# Patient Record
Sex: MALE | URBAN - METROPOLITAN AREA
[De-identification: names, ages, dates, MRNs, and addresses within clinical notes are randomized per-mention and may not be internally consistent; named-entity substitution may affect disease eponyms.]

---

## 2017-10-17 ENCOUNTER — APPOINTMENT (RX ONLY)
Dept: URBAN - METROPOLITAN AREA CLINIC 124 | Facility: CLINIC | Age: 72
Setting detail: DERMATOLOGY
End: 2017-10-17

## 2017-10-17 DIAGNOSIS — L82.1 OTHER SEBORRHEIC KERATOSIS: ICD-10-CM

## 2017-10-17 DIAGNOSIS — Z71.89 OTHER SPECIFIED COUNSELING: ICD-10-CM

## 2017-10-17 DIAGNOSIS — L81.4 OTHER MELANIN HYPERPIGMENTATION: ICD-10-CM

## 2017-10-17 DIAGNOSIS — D18.0 HEMANGIOMA: ICD-10-CM

## 2017-10-17 DIAGNOSIS — L82.0 INFLAMED SEBORRHEIC KERATOSIS: ICD-10-CM

## 2017-10-17 PROBLEM — H91.90 UNSPECIFIED HEARING LOSS, UNSPECIFIED EAR: Status: ACTIVE | Noted: 2017-10-17

## 2017-10-17 PROBLEM — E78.5 HYPERLIPIDEMIA, UNSPECIFIED: Status: ACTIVE | Noted: 2017-10-17

## 2017-10-17 PROBLEM — K21.9 GASTRO-ESOPHAGEAL REFLUX DISEASE WITHOUT ESOPHAGITIS: Status: ACTIVE | Noted: 2017-10-17

## 2017-10-17 PROBLEM — D18.01 HEMANGIOMA OF SKIN AND SUBCUTANEOUS TISSUE: Status: ACTIVE | Noted: 2017-10-17

## 2017-10-17 PROCEDURE — 99203 OFFICE O/P NEW LOW 30 MIN: CPT | Mod: 25

## 2017-10-17 PROCEDURE — 17110 DESTRUCTION B9 LES UP TO 14: CPT

## 2017-10-17 PROCEDURE — ? PRESCRIPTION

## 2017-10-17 PROCEDURE — ? LIQUID NITROGEN

## 2017-10-17 PROCEDURE — ? COUNSELING

## 2017-10-17 RX ORDER — PHARMACY COMPOUNDING ACCESSORY
EACH MISCELLANEOUS
Qty: 1 | Refills: 3 | COMMUNITY
Start: 2017-10-17

## 2017-10-17 RX ADMIN — Medication: at 19:20

## 2017-10-17 ASSESSMENT — LOCATION SIMPLE DESCRIPTION DERM
LOCATION SIMPLE: LEFT HAND
LOCATION SIMPLE: ABDOMEN
LOCATION SIMPLE: UPPER BACK

## 2017-10-17 ASSESSMENT — LOCATION DETAILED DESCRIPTION DERM
LOCATION DETAILED: PERIUMBILICAL SKIN
LOCATION DETAILED: INFERIOR THORACIC SPINE
LOCATION DETAILED: LEFT RADIAL DORSAL HAND
LOCATION DETAILED: EPIGASTRIC SKIN

## 2017-10-17 ASSESSMENT — LOCATION ZONE DERM
LOCATION ZONE: TRUNK
LOCATION ZONE: HAND

## 2017-10-17 NOTE — PROCEDURE: MIPS QUALITY
Quality 402: Tobacco Use And Help With Quitting Among Adolescents: Patient screened for tobacco and never smoked
Quality 110: Preventive Care And Screening: Influenza Immunization: Influenza Immunization Administered during Influenza season
Detail Level: Detailed
Quality 111:Pneumonia Vaccination Status For Older Adults: Pneumococcal Vaccination Previously Received

## 2017-10-17 NOTE — PROCEDURE: LIQUID NITROGEN
Detail Level: Detailed
Consent: The patient's consent was obtained including but not limited to risks of crusting, scabbing, blistering, scarring, darker or lighter pigmentary change, recurrence, incomplete removal and infection.
Render Post-Care Instructions In Note?: no
Number Of Freeze-Thaw Cycles: 2 freeze-thaw cycles
Medical Necessity Clause: This procedure was medically necessary because the lesions that were treated were:
Post-Care Instructions: I reviewed with the patient in detail post-care instructions. Patient is to wear sunprotection, and avoid picking at any of the treated lesions. Pt may apply Vaseline to crusted or scabbing areas.
Medical Necessity Information: It is in your best interest to select a reason for this procedure from the list below. All of these items fulfill various CMS LCD requirements except the new and changing color options.

## 2022-06-04 ENCOUNTER — TELEPHONE ENCOUNTER (OUTPATIENT)
Dept: URBAN - METROPOLITAN AREA CLINIC 68 | Facility: CLINIC | Age: 77
End: 2022-06-04

## 2022-06-05 ENCOUNTER — TELEPHONE ENCOUNTER (OUTPATIENT)
Dept: URBAN - METROPOLITAN AREA CLINIC 68 | Facility: CLINIC | Age: 77
End: 2022-06-05

## 2022-06-05 RX ORDER — OMEGA-3S/DHA/EPA/FISH OIL/D3 300MG-1000
FISH OIL BURP-LESS( 1200MG ORAL  DAILY ) ACTIVE -HX ENTRY CAPSULE ORAL DAILY
Status: ACTIVE | COMMUNITY
Start: 2012-09-19

## 2022-06-05 RX ORDER — ZOLPIDEM TARTRATE 10 MG/1
AMBIEN( 10MG ORAL  DAILY ) ACTIVE -HX ENTRY TABLET, FILM COATED ORAL DAILY
Status: ACTIVE | COMMUNITY
Start: 2012-09-19

## 2022-06-05 RX ORDER — FINASTERIDE 5 MG/1
FINASTERIDE( 5MG ORAL  DAILY ) ACTIVE -HX ENTRY TABLET, FILM COATED ORAL DAILY
Status: ACTIVE | COMMUNITY
Start: 2012-09-19

## 2022-06-05 RX ORDER — SIMVASTATIN 10 MG/1
ZOCOR( 10MG ORAL  DAILY ) ACTIVE -HX ENTRY TABLET, FILM COATED ORAL DAILY
Status: ACTIVE | COMMUNITY
Start: 2012-09-19

## 2022-06-05 RX ORDER — LISINOPRIL 20 MG/1
LISINOPRIL( 20MG ORAL  DAILY ) ACTIVE -HX ENTRY TABLET ORAL DAILY
Status: ACTIVE | COMMUNITY
Start: 2012-09-19

## 2022-06-25 ENCOUNTER — TELEPHONE ENCOUNTER (OUTPATIENT)
Age: 77
End: 2022-06-25

## 2022-06-26 ENCOUNTER — TELEPHONE ENCOUNTER (OUTPATIENT)
Age: 77
End: 2022-06-26

## 2022-06-26 RX ORDER — FINASTERIDE 5 MG/1
FINASTERIDE( 5MG ORAL  DAILY ) ACTIVE -HX ENTRY TABLET, FILM COATED ORAL DAILY
Status: ACTIVE | COMMUNITY
Start: 2012-09-19

## 2022-06-26 RX ORDER — ASPIRIN 81 MG/1
ASPIRIN( 81MG ORAL  DAILY ) ACTIVE -HX ENTRY TABLET, DELAYED RELEASE ORAL DAILY
Status: ACTIVE | COMMUNITY
Start: 2012-09-19

## 2022-06-26 RX ORDER — SIMVASTATIN 10 MG/1
ZOCOR( 10MG ORAL  DAILY ) ACTIVE -HX ENTRY TABLET, FILM COATED ORAL DAILY
Status: ACTIVE | COMMUNITY
Start: 2012-09-19

## 2022-06-26 RX ORDER — ZOLPIDEM TARTRATE 10 MG
AMBIEN( 10MG ORAL  DAILY ) ACTIVE -HX ENTRY TABLET ORAL DAILY
Status: ACTIVE | COMMUNITY
Start: 2012-09-19

## 2022-06-26 RX ORDER — LISINOPRIL 20 MG/1
LISINOPRIL( 20MG ORAL  DAILY ) ACTIVE -HX ENTRY TABLET ORAL DAILY
Status: ACTIVE | COMMUNITY
Start: 2012-09-19

## 2022-06-26 RX ORDER — OMEGA-3S/DHA/EPA/FISH OIL/D3 300MG-1000
FISH OIL BURP-LESS( 1200MG ORAL  DAILY ) ACTIVE -HX ENTRY CAPSULE ORAL DAILY
Status: ACTIVE | COMMUNITY
Start: 2012-09-19

## 2023-04-27 NOTE — PROGRESS NOTES
"      Subjective     Luis Bain is a 77 y.o. male who presents for Follow-up (Follow up meds).      HPI  The patient is a 77 year-old male presenting to the clinic for follow up on medications.  Discussed Lumbar Radiculopathy.  Referral placed with Neurosurgery 5/1/2023.  Discussed Prostate nodules.  Referral placed with Urology 5/1/2023.  Complete blood work after fasting for 10 hours.  Follow up in office.     Follow-up.  Had a colonoscopy done.  Goes to neurologist for memory problems.  Discussed L5-S1 spinal stenosis    Complaining feeling tired.  Advised on diet exercise.  Educated on dyslipidemia and diet exercise.  Discussed blood work for screening for condition.  Educated on BPH.  Educated hypertension low-salt and exercise.  Educated on acid reflux and heartburn prevention.  Denies nausea vomiting and diarrhea educated on vitamin D deficiency.  Discussed about prostate nodule.  Keep appointment with the urologist.  Discussed about lumbar radiculopathy.  Keep appointment with the neurosurgeon.  Discussed memory problem.  Keep appointment with neurologist          Review of Systems  Review of systems    General.  Denies fever.  Denies chills.    HEENT denies nasal congestion.  Denies sinus pressure.    Respiratory.  Denies cough.  Denies shortness of breath.    Cardiovascular.  Denies chest pain.  Denies heart palpitations.  Denies shortness of breath.    Gastrointestinal.  Denies nausea vomiting diarrhea.  Denies abdominal pain.    Genitourinary.  Dictated as above.      Neurology.  Dictated as above.  Musculoskeletal.  Dictated as above.      Endocrinology.  Denies cold intolerance.  Denies hot intolerance.    Psychiatric.  Denies depression.  Denies anxiety.  Denies suicidal.  Denies homicidal.      Objective   /76 (BP Location: Left arm, Patient Position: Sitting, BP Cuff Size: Adult)   Pulse 56   Ht 1.803 m (5' 11\")   Wt 66.1 kg (145 lb 12.8 oz)   SpO2 99%   BMI 20.33 kg/m²    "     Physical Exam  General.  Not in distress.  HEENT normocephalic anicteric sclerae.  Neck soft supple no thyromegaly.  No carotid bruit.  Lungs are clear.  Heart regular.  Abdomen soft nontender nondistended bowel sounds are positive.  Extremities no clubbing cyanosis or edema.  Psychiatric.  Has good eye contact.  No crying spells noted.  Speech was normal.  Denies depression.  Denies suicidal.  Denies homicidal.  Musculoskeletal.  Tenderness noted on lower lumbar paraspinal muscular area    Assessment/Plan     1.  Fatigue.  Denies restless legs but denies obstructive sleep apnea.  2.  Dyslipidemia.  Educated on diet and exercise.  We will continue monitor.    3.  Screening for condition.  Dictated as above.    4.  BPH.  Dictated as above.    5.  Hypertension.  Educated on low-salt and exercise.  We will continue monitor.    6.  Acid reflux.  Educated on acid reflux and heartburn and denies nausea vomiting and diarrhea    7.  Vitamin D deficiency.  Educated on vitamin D deficiency.  We will continue monitor.    8.  Prostate nodule.  Dictated as above.    9.  Lumbar radiculopathy.  Dictated as above.    10.  Memory problem.  Dictated as above                                  Problem List Items Addressed This Visit          Circulatory    Essential hypertension    Relevant Orders    Albumin , Urine Random       Digestive    GERD (gastroesophageal reflux disease)       Genitourinary    BPH (benign prostatic hyperplasia)    Relevant Orders    PSA, total and free       Other    Dyslipidemia    Relevant Orders    Lipid panel    Fatigue - Primary    Relevant Orders    CBC and Auto Differential    Comprehensive metabolic panel    Urinalysis with Reflex Microscopic    Tsh With Reflex To Free T4 If Abnormal    Vitamin B12    Folate     Other Visit Diagnoses       Screening for condition        Relevant Orders    Type and screen    Vitamin D deficiency        Relevant Orders    Vitamin D 25-Hydroxy,Total    Prostate nodule         Relevant Orders    Referral to Urology    Lumbar radiculopathy        Relevant Orders    Referral to Neurosurgery    Memory problem            Scribsanya Attestation  By signing my name below, I, Lien Boston   attest that this documentation has been prepared under the direction and in the presence of Oren Tucker MD.

## 2023-05-01 ENCOUNTER — OFFICE VISIT (OUTPATIENT)
Dept: PRIMARY CARE | Facility: CLINIC | Age: 78
End: 2023-05-01
Payer: MEDICARE

## 2023-05-01 VITALS
HEART RATE: 56 BPM | WEIGHT: 145.8 LBS | HEIGHT: 71 IN | BODY MASS INDEX: 20.41 KG/M2 | SYSTOLIC BLOOD PRESSURE: 114 MMHG | DIASTOLIC BLOOD PRESSURE: 76 MMHG | OXYGEN SATURATION: 99 %

## 2023-05-01 DIAGNOSIS — N40.1 BENIGN PROSTATIC HYPERPLASIA WITH NOCTURIA: ICD-10-CM

## 2023-05-01 DIAGNOSIS — R53.83 OTHER FATIGUE: Primary | ICD-10-CM

## 2023-05-01 DIAGNOSIS — M54.16 LUMBAR RADICULOPATHY: ICD-10-CM

## 2023-05-01 DIAGNOSIS — K21.9 GASTROESOPHAGEAL REFLUX DISEASE, UNSPECIFIED WHETHER ESOPHAGITIS PRESENT: ICD-10-CM

## 2023-05-01 DIAGNOSIS — Z13.9 SCREENING FOR CONDITION: ICD-10-CM

## 2023-05-01 DIAGNOSIS — R41.3 MEMORY PROBLEM: ICD-10-CM

## 2023-05-01 DIAGNOSIS — R35.1 BENIGN PROSTATIC HYPERPLASIA WITH NOCTURIA: ICD-10-CM

## 2023-05-01 DIAGNOSIS — E78.5 DYSLIPIDEMIA: ICD-10-CM

## 2023-05-01 DIAGNOSIS — N40.2 PROSTATE NODULE: ICD-10-CM

## 2023-05-01 DIAGNOSIS — I10 ESSENTIAL HYPERTENSION: ICD-10-CM

## 2023-05-01 DIAGNOSIS — E55.9 VITAMIN D DEFICIENCY: ICD-10-CM

## 2023-05-01 PROBLEM — H81.10 BPPV (BENIGN PAROXYSMAL POSITIONAL VERTIGO): Status: ACTIVE | Noted: 2023-05-01

## 2023-05-01 PROBLEM — R51.9 HEADACHE: Status: ACTIVE | Noted: 2023-05-01

## 2023-05-01 PROBLEM — R79.89 ABNORMAL CBC: Status: ACTIVE | Noted: 2023-05-01

## 2023-05-01 PROBLEM — N40.0 BPH (BENIGN PROSTATIC HYPERPLASIA): Status: ACTIVE | Noted: 2023-05-01

## 2023-05-01 PROBLEM — R93.89 ABNORMAL CT SCAN: Status: ACTIVE | Noted: 2023-05-01

## 2023-05-01 PROBLEM — H10.32 ACUTE BACTERIAL CONJUNCTIVITIS OF LEFT EYE: Status: ACTIVE | Noted: 2023-05-01

## 2023-05-01 PROCEDURE — 1159F MED LIST DOCD IN RCRD: CPT | Performed by: FAMILY MEDICINE

## 2023-05-01 PROCEDURE — 3074F SYST BP LT 130 MM HG: CPT | Performed by: FAMILY MEDICINE

## 2023-05-01 PROCEDURE — 3078F DIAST BP <80 MM HG: CPT | Performed by: FAMILY MEDICINE

## 2023-05-01 PROCEDURE — 99214 OFFICE O/P EST MOD 30 MIN: CPT | Performed by: FAMILY MEDICINE

## 2023-05-01 PROCEDURE — 1036F TOBACCO NON-USER: CPT | Performed by: FAMILY MEDICINE

## 2023-05-01 PROCEDURE — 1160F RVW MEDS BY RX/DR IN RCRD: CPT | Performed by: FAMILY MEDICINE

## 2023-05-01 RX ORDER — EPINEPHRINE 0.22MG
1 AEROSOL WITH ADAPTER (ML) INHALATION 2 TIMES DAILY
COMMUNITY
Start: 2020-08-12

## 2023-05-01 RX ORDER — VALSARTAN 160 MG/1
160 TABLET ORAL DAILY
COMMUNITY
End: 2024-04-22

## 2023-05-01 RX ORDER — TADALAFIL 5 MG/1
5 TABLET ORAL DAILY
COMMUNITY
Start: 2023-04-24 | End: 2023-10-09 | Stop reason: SDUPTHER

## 2023-05-01 RX ORDER — LOSARTAN POTASSIUM 100 MG/1
1 TABLET ORAL DAILY
COMMUNITY
Start: 2020-08-12 | End: 2023-05-25 | Stop reason: SDUPTHER

## 2023-05-01 RX ORDER — LANSOPRAZOLE 30 MG/1
30 CAPSULE, DELAYED RELEASE ORAL
COMMUNITY
Start: 2015-07-07

## 2023-05-01 RX ORDER — LOSARTAN POTASSIUM 100 MG/1
100 TABLET ORAL
COMMUNITY
End: 2023-06-12 | Stop reason: SDUPTHER

## 2023-05-01 RX ORDER — ONDANSETRON 4 MG/1
4 TABLET, ORALLY DISINTEGRATING ORAL EVERY 8 HOURS PRN
COMMUNITY
Start: 2022-07-12

## 2023-05-01 RX ORDER — BUTYROSPERMUM PARKII(SHEA BUTTER), SIMMONDSIA CHINENSIS (JOJOBA) SEED OIL, ALOE BARBADENSIS LEAF EXTRACT .01; 1; 3.5 G/100G; G/100G; G/100G
100 LIQUID TOPICAL DAILY
COMMUNITY

## 2023-05-01 RX ORDER — MECLIZINE HCL 12.5 MG 12.5 MG/1
1 TABLET ORAL 3 TIMES DAILY PRN
COMMUNITY
Start: 2022-08-02

## 2023-05-01 RX ORDER — SIMVASTATIN 10 MG/1
10 TABLET, FILM COATED ORAL NIGHTLY
COMMUNITY
Start: 2020-08-12 | End: 2023-05-25 | Stop reason: SDUPTHER

## 2023-05-01 RX ORDER — FINASTERIDE 5 MG/1
5 TABLET, FILM COATED ORAL DAILY
COMMUNITY
Start: 2020-08-12 | End: 2023-05-25 | Stop reason: SDUPTHER

## 2023-05-01 ASSESSMENT — ENCOUNTER SYMPTOMS
LOSS OF SENSATION IN FEET: 0
DEPRESSION: 0
OCCASIONAL FEELINGS OF UNSTEADINESS: 0

## 2023-05-01 ASSESSMENT — PAIN SCALES - GENERAL: PAINLEVEL: 0-NO PAIN

## 2023-05-05 ENCOUNTER — LAB (OUTPATIENT)
Dept: LAB | Facility: LAB | Age: 78
End: 2023-05-05
Payer: MEDICARE

## 2023-05-05 DIAGNOSIS — Z13.9 SCREENING FOR CONDITION: ICD-10-CM

## 2023-05-05 DIAGNOSIS — N40.1 BENIGN PROSTATIC HYPERPLASIA WITH NOCTURIA: ICD-10-CM

## 2023-05-05 DIAGNOSIS — R53.83 OTHER FATIGUE: ICD-10-CM

## 2023-05-05 DIAGNOSIS — E55.9 VITAMIN D DEFICIENCY: ICD-10-CM

## 2023-05-05 DIAGNOSIS — E78.5 DYSLIPIDEMIA: ICD-10-CM

## 2023-05-05 DIAGNOSIS — R35.1 BENIGN PROSTATIC HYPERPLASIA WITH NOCTURIA: ICD-10-CM

## 2023-05-05 LAB
ABO GROUP (TYPE) IN BLOOD: NORMAL
ALANINE AMINOTRANSFERASE (SGPT) (U/L) IN SER/PLAS: 10 U/L (ref 10–52)
ALBUMIN (G/DL) IN SER/PLAS: 4.4 G/DL (ref 3.4–5)
ALKALINE PHOSPHATASE (U/L) IN SER/PLAS: 44 U/L (ref 33–136)
ANION GAP IN SER/PLAS: 15 MMOL/L (ref 10–20)
ANTIBODY SCREEN: NORMAL
APPEARANCE, URINE: CLEAR
ASPARTATE AMINOTRANSFERASE (SGOT) (U/L) IN SER/PLAS: 18 U/L (ref 9–39)
BASOPHILS (10*3/UL) IN BLOOD BY AUTOMATED COUNT: 0.04 X10E9/L (ref 0–0.1)
BASOPHILS/100 LEUKOCYTES IN BLOOD BY AUTOMATED COUNT: 0.8 % (ref 0–2)
BILIRUBIN TOTAL (MG/DL) IN SER/PLAS: 0.9 MG/DL (ref 0–1.2)
BILIRUBIN, URINE: NEGATIVE
BLOOD, URINE: NEGATIVE
CALCIUM (MG/DL) IN SER/PLAS: 9.4 MG/DL (ref 8.6–10.3)
CARBON DIOXIDE, TOTAL (MMOL/L) IN SER/PLAS: 27 MMOL/L (ref 21–32)
CHLORIDE (MMOL/L) IN SER/PLAS: 105 MMOL/L (ref 98–107)
CHOLESTEROL (MG/DL) IN SER/PLAS: 180 MG/DL (ref 0–199)
CHOLESTEROL IN HDL (MG/DL) IN SER/PLAS: 60.7 MG/DL
CHOLESTEROL/HDL RATIO: 3
COLOR, URINE: YELLOW
CREATININE (MG/DL) IN SER/PLAS: 0.94 MG/DL (ref 0.5–1.3)
EOSINOPHILS (10*3/UL) IN BLOOD BY AUTOMATED COUNT: 0.07 X10E9/L (ref 0–0.4)
EOSINOPHILS/100 LEUKOCYTES IN BLOOD BY AUTOMATED COUNT: 1.4 % (ref 0–6)
ERYTHROCYTE DISTRIBUTION WIDTH (RATIO) BY AUTOMATED COUNT: 13.1 % (ref 11.5–14.5)
ERYTHROCYTE MEAN CORPUSCULAR HEMOGLOBIN CONCENTRATION (G/DL) BY AUTOMATED: 31.9 G/DL (ref 32–36)
ERYTHROCYTE MEAN CORPUSCULAR VOLUME (FL) BY AUTOMATED COUNT: 99 FL (ref 80–100)
ERYTHROCYTES (10*6/UL) IN BLOOD BY AUTOMATED COUNT: 4.45 X10E12/L (ref 4.5–5.9)
GFR MALE: 84 ML/MIN/1.73M2
GLUCOSE (MG/DL) IN SER/PLAS: 96 MG/DL (ref 74–99)
GLUCOSE, URINE: NEGATIVE MG/DL
HEMATOCRIT (%) IN BLOOD BY AUTOMATED COUNT: 43.9 % (ref 41–52)
HEMOGLOBIN (G/DL) IN BLOOD: 14 G/DL (ref 13.5–17.5)
IMMATURE GRANULOCYTES/100 LEUKOCYTES IN BLOOD BY AUTOMATED COUNT: 0.2 % (ref 0–0.9)
KETONES, URINE: NEGATIVE MG/DL
LDL: 107 MG/DL (ref 0–99)
LEUKOCYTE ESTERASE, URINE: NEGATIVE
LEUKOCYTES (10*3/UL) IN BLOOD BY AUTOMATED COUNT: 4.9 X10E9/L (ref 4.4–11.3)
LYMPHOCYTES (10*3/UL) IN BLOOD BY AUTOMATED COUNT: 1.41 X10E9/L (ref 0.8–3)
LYMPHOCYTES/100 LEUKOCYTES IN BLOOD BY AUTOMATED COUNT: 29.1 % (ref 13–44)
MONOCYTES (10*3/UL) IN BLOOD BY AUTOMATED COUNT: 0.53 X10E9/L (ref 0.05–0.8)
MONOCYTES/100 LEUKOCYTES IN BLOOD BY AUTOMATED COUNT: 10.9 % (ref 2–10)
NEUTROPHILS (10*3/UL) IN BLOOD BY AUTOMATED COUNT: 2.79 X10E9/L (ref 1.6–5.5)
NEUTROPHILS/100 LEUKOCYTES IN BLOOD BY AUTOMATED COUNT: 57.6 % (ref 40–80)
NITRITE, URINE: NEGATIVE
PH, URINE: 7 (ref 5–8)
PLATELETS (10*3/UL) IN BLOOD AUTOMATED COUNT: 226 X10E9/L (ref 150–450)
POTASSIUM (MMOL/L) IN SER/PLAS: 4.9 MMOL/L (ref 3.5–5.3)
PROTEIN TOTAL: 6.4 G/DL (ref 6.4–8.2)
PROTEIN, URINE: NEGATIVE MG/DL
RH FACTOR: NORMAL
SODIUM (MMOL/L) IN SER/PLAS: 142 MMOL/L (ref 136–145)
SPECIFIC GRAVITY, URINE: 1.02 (ref 1–1.03)
THYROTROPIN (MIU/L) IN SER/PLAS BY DETECTION LIMIT <= 0.05 MIU/L: 3.38 MIU/L (ref 0.44–3.98)
TRIGLYCERIDE (MG/DL) IN SER/PLAS: 62 MG/DL (ref 0–149)
UREA NITROGEN (MG/DL) IN SER/PLAS: 22 MG/DL (ref 6–23)
UROBILINOGEN, URINE: <2 MG/DL (ref 0–1.9)
VLDL: 12 MG/DL (ref 0–40)

## 2023-05-05 PROCEDURE — 86901 BLOOD TYPING SEROLOGIC RH(D): CPT

## 2023-05-05 PROCEDURE — 81003 URINALYSIS AUTO W/O SCOPE: CPT

## 2023-05-05 PROCEDURE — 82306 VITAMIN D 25 HYDROXY: CPT

## 2023-05-05 PROCEDURE — 82570 ASSAY OF URINE CREATININE: CPT

## 2023-05-05 PROCEDURE — 36415 COLL VENOUS BLD VENIPUNCTURE: CPT

## 2023-05-05 PROCEDURE — 80061 LIPID PANEL: CPT

## 2023-05-05 PROCEDURE — 84154 ASSAY OF PSA FREE: CPT

## 2023-05-05 PROCEDURE — 86850 RBC ANTIBODY SCREEN: CPT

## 2023-05-05 PROCEDURE — 80053 COMPREHEN METABOLIC PANEL: CPT

## 2023-05-05 PROCEDURE — 82746 ASSAY OF FOLIC ACID SERUM: CPT

## 2023-05-05 PROCEDURE — 86900 BLOOD TYPING SEROLOGIC ABO: CPT

## 2023-05-05 PROCEDURE — 84443 ASSAY THYROID STIM HORMONE: CPT

## 2023-05-05 PROCEDURE — 85025 COMPLETE CBC W/AUTO DIFF WBC: CPT

## 2023-05-05 PROCEDURE — 82607 VITAMIN B-12: CPT

## 2023-05-05 PROCEDURE — 84153 ASSAY OF PSA TOTAL: CPT

## 2023-05-05 PROCEDURE — 82043 UR ALBUMIN QUANTITATIVE: CPT

## 2023-05-06 LAB
ALBUMIN (MG/L) IN URINE: <7 MG/L
ALBUMIN/CREATININE (UG/MG) IN URINE: NORMAL UG/MG CRT (ref 0–30)
CALCIDIOL (25 OH VITAMIN D3) (NG/ML) IN SER/PLAS: 96 NG/ML
COBALAMIN (VITAMIN B12) (PG/ML) IN SER/PLAS: 1160 PG/ML (ref 211–911)
CREATININE (MG/DL) IN URINE: 103 MG/DL (ref 20–370)
FOLATE (NG/ML) IN SER/PLAS: 17.9 NG/ML

## 2023-05-08 ENCOUNTER — APPOINTMENT (OUTPATIENT)
Dept: PRIMARY CARE | Facility: CLINIC | Age: 78
End: 2023-05-08
Payer: MEDICARE

## 2023-05-08 LAB
PROSTATE SPECIFIC AG (NG/ML) IN SER/PLAS: 2.6 NG/ML (ref 0–4)
PROSTATE SPECIFIC AG FREE (NG/ML) IN SER/PLAS: 0.5 NG/ML
PROSTATE SPECIFIC AG FREE/PROSTATE SPECIFIC AG TOTAL IN SER/PLAS: 19 %

## 2023-05-25 DIAGNOSIS — I10 PRIMARY HYPERTENSION: ICD-10-CM

## 2023-05-25 DIAGNOSIS — N40.0 BENIGN PROSTATIC HYPERPLASIA WITHOUT LOWER URINARY TRACT SYMPTOMS: ICD-10-CM

## 2023-05-25 DIAGNOSIS — E78.5 DYSLIPIDEMIA: ICD-10-CM

## 2023-05-25 RX ORDER — SIMVASTATIN 10 MG/1
10 TABLET, FILM COATED ORAL NIGHTLY
Qty: 90 TABLET | Refills: 0 | Status: SHIPPED | OUTPATIENT
Start: 2023-05-25 | End: 2023-06-12 | Stop reason: SDUPTHER

## 2023-05-25 RX ORDER — LOSARTAN POTASSIUM 100 MG/1
100 TABLET ORAL DAILY
Qty: 90 TABLET | Refills: 0 | Status: SHIPPED | OUTPATIENT
Start: 2023-05-25 | End: 2023-06-19

## 2023-05-25 RX ORDER — FINASTERIDE 5 MG/1
5 TABLET, FILM COATED ORAL DAILY
Qty: 90 TABLET | Refills: 0 | Status: SHIPPED | OUTPATIENT
Start: 2023-05-25 | End: 2023-06-12 | Stop reason: SDUPTHER

## 2023-06-02 RX ORDER — PREGABALIN 50 MG/1
50 CAPSULE ORAL 2 TIMES DAILY
Qty: 60 CAPSULE | Refills: 0 | OUTPATIENT
Start: 2023-06-02

## 2023-06-02 RX ORDER — PREGABALIN 50 MG/1
50 CAPSULE ORAL 2 TIMES DAILY
COMMUNITY
End: 2023-09-08 | Stop reason: SINTOL

## 2023-06-12 DIAGNOSIS — N40.0 BENIGN PROSTATIC HYPERPLASIA WITHOUT LOWER URINARY TRACT SYMPTOMS: ICD-10-CM

## 2023-06-12 DIAGNOSIS — E78.5 DYSLIPIDEMIA: ICD-10-CM

## 2023-06-12 DIAGNOSIS — I10 PRIMARY HYPERTENSION: Primary | ICD-10-CM

## 2023-06-12 RX ORDER — FINASTERIDE 5 MG/1
5 TABLET, FILM COATED ORAL DAILY
Qty: 90 TABLET | Refills: 0 | Status: SHIPPED | OUTPATIENT
Start: 2023-06-12 | End: 2023-06-16 | Stop reason: SDUPTHER

## 2023-06-12 RX ORDER — SIMVASTATIN 10 MG/1
10 TABLET, FILM COATED ORAL NIGHTLY
Qty: 90 TABLET | Refills: 0 | Status: SHIPPED | OUTPATIENT
Start: 2023-06-12 | End: 2023-06-16 | Stop reason: SDUPTHER

## 2023-06-12 RX ORDER — LOSARTAN POTASSIUM 100 MG/1
100 TABLET ORAL
Qty: 90 TABLET | Refills: 0 | Status: SHIPPED | OUTPATIENT
Start: 2023-06-12 | End: 2023-06-16 | Stop reason: SDUPTHER

## 2023-06-16 DIAGNOSIS — I10 PRIMARY HYPERTENSION: ICD-10-CM

## 2023-06-16 DIAGNOSIS — N40.0 BENIGN PROSTATIC HYPERPLASIA WITHOUT LOWER URINARY TRACT SYMPTOMS: ICD-10-CM

## 2023-06-16 DIAGNOSIS — E78.5 DYSLIPIDEMIA: ICD-10-CM

## 2023-06-16 RX ORDER — SIMVASTATIN 10 MG/1
10 TABLET, FILM COATED ORAL NIGHTLY
Qty: 30 TABLET | Refills: 1 | Status: SHIPPED | OUTPATIENT
Start: 2023-06-16 | End: 2023-06-19

## 2023-06-16 RX ORDER — FINASTERIDE 5 MG/1
5 TABLET, FILM COATED ORAL DAILY
Qty: 30 TABLET | Refills: 0 | Status: SHIPPED | OUTPATIENT
Start: 2023-06-16 | End: 2023-06-19

## 2023-06-16 RX ORDER — LOSARTAN POTASSIUM 100 MG/1
100 TABLET ORAL
Qty: 30 TABLET | Refills: 1 | Status: SHIPPED | OUTPATIENT
Start: 2023-06-16 | End: 2023-10-31 | Stop reason: SDUPTHER

## 2023-06-16 NOTE — TELEPHONE ENCOUNTER
[facility] but should have went to [facility]. Please resend

## 2023-06-17 DIAGNOSIS — I10 PRIMARY HYPERTENSION: ICD-10-CM

## 2023-06-17 DIAGNOSIS — N40.0 BENIGN PROSTATIC HYPERPLASIA WITHOUT LOWER URINARY TRACT SYMPTOMS: ICD-10-CM

## 2023-06-17 DIAGNOSIS — E78.5 DYSLIPIDEMIA: ICD-10-CM

## 2023-06-19 RX ORDER — FINASTERIDE 5 MG/1
TABLET, FILM COATED ORAL
Qty: 90 TABLET | Refills: 0 | Status: SHIPPED | OUTPATIENT
Start: 2023-06-19 | End: 2023-10-31 | Stop reason: SDUPTHER

## 2023-06-19 RX ORDER — SIMVASTATIN 10 MG/1
TABLET, FILM COATED ORAL
Qty: 90 TABLET | Refills: 0 | Status: SHIPPED | OUTPATIENT
Start: 2023-06-19 | End: 2023-10-31 | Stop reason: SDUPTHER

## 2023-06-19 RX ORDER — LOSARTAN POTASSIUM 100 MG/1
TABLET ORAL
Qty: 90 TABLET | Refills: 0 | Status: SHIPPED | OUTPATIENT
Start: 2023-06-19 | End: 2024-04-22

## 2023-06-29 LAB
ALANINE AMINOTRANSFERASE (SGPT) (U/L) IN SER/PLAS: 11 U/L (ref 10–52)
ALBUMIN (G/DL) IN SER/PLAS: 4 G/DL (ref 3.4–5)
ALKALINE PHOSPHATASE (U/L) IN SER/PLAS: 45 U/L (ref 33–136)
ANION GAP IN SER/PLAS: 10 MMOL/L (ref 10–20)
ASPARTATE AMINOTRANSFERASE (SGOT) (U/L) IN SER/PLAS: 17 U/L (ref 9–39)
BILIRUBIN TOTAL (MG/DL) IN SER/PLAS: 0.4 MG/DL (ref 0–1.2)
CALCIUM (MG/DL) IN SER/PLAS: 9.2 MG/DL (ref 8.6–10.3)
CARBON DIOXIDE, TOTAL (MMOL/L) IN SER/PLAS: 30 MMOL/L (ref 21–32)
CHLORIDE (MMOL/L) IN SER/PLAS: 106 MMOL/L (ref 98–107)
CREATININE (MG/DL) IN SER/PLAS: 0.96 MG/DL (ref 0.5–1.3)
GFR MALE: 81 ML/MIN/1.73M2
GLUCOSE (MG/DL) IN SER/PLAS: 97 MG/DL (ref 74–99)
POTASSIUM (MMOL/L) IN SER/PLAS: 4.7 MMOL/L (ref 3.5–5.3)
PROTEIN TOTAL: 6.4 G/DL (ref 6.4–8.2)
SODIUM (MMOL/L) IN SER/PLAS: 141 MMOL/L (ref 136–145)
UREA NITROGEN (MG/DL) IN SER/PLAS: 17 MG/DL (ref 6–23)

## 2023-06-30 LAB — AMMONIA (UMOL/L) IN PLASMA: 14 UMOL/L

## 2023-07-04 LAB — VITAMIN B1, WHOLE BLOOD: 172 NMOL/L (ref 70–180)

## 2023-09-08 ENCOUNTER — OFFICE VISIT (OUTPATIENT)
Dept: PRIMARY CARE | Facility: CLINIC | Age: 78
End: 2023-09-08
Payer: MEDICARE

## 2023-09-08 VITALS
HEART RATE: 85 BPM | DIASTOLIC BLOOD PRESSURE: 74 MMHG | OXYGEN SATURATION: 95 % | SYSTOLIC BLOOD PRESSURE: 111 MMHG | BODY MASS INDEX: 20.3 KG/M2 | HEIGHT: 71 IN | WEIGHT: 145 LBS

## 2023-09-08 DIAGNOSIS — Z00.00 HEALTH CARE MAINTENANCE: ICD-10-CM

## 2023-09-08 DIAGNOSIS — I83.93 ASYMPTOMATIC VARICOSE VEINS OF BOTH LOWER EXTREMITIES: ICD-10-CM

## 2023-09-08 DIAGNOSIS — M43.06 LUMBAR SPONDYLOLYSIS: ICD-10-CM

## 2023-09-08 DIAGNOSIS — K55.029: ICD-10-CM

## 2023-09-08 DIAGNOSIS — M25.561 CHRONIC PAIN OF RIGHT KNEE: ICD-10-CM

## 2023-09-08 DIAGNOSIS — G89.29 CHRONIC PAIN OF RIGHT KNEE: ICD-10-CM

## 2023-09-08 DIAGNOSIS — J06.9 UPPER RESPIRATORY TRACT INFECTION, UNSPECIFIED TYPE: ICD-10-CM

## 2023-09-08 PROBLEM — T48.1X5A: Status: ACTIVE | Noted: 2023-09-08

## 2023-09-08 PROBLEM — E55.9 VITAMIN D DEFICIENCY: Status: ACTIVE | Noted: 2023-09-08

## 2023-09-08 PROBLEM — R41.3 MEMORY PROBLEM: Status: ACTIVE | Noted: 2023-09-08

## 2023-09-08 PROBLEM — G62.9 NEUROPATHY: Status: ACTIVE | Noted: 2023-09-08

## 2023-09-08 PROBLEM — M19.91 LOCALIZED, PRIMARY OSTEOARTHRITIS: Status: ACTIVE | Noted: 2021-05-21

## 2023-09-08 PROBLEM — E78.00 PURE HYPERCHOLESTEROLEMIA: Status: ACTIVE | Noted: 2023-09-08

## 2023-09-08 PROBLEM — D49.2 NERVE SHEATH TUMOR: Status: ACTIVE | Noted: 2023-09-08

## 2023-09-08 PROBLEM — T75.3XXA MOTION SICKNESS: Status: ACTIVE | Noted: 2023-09-08

## 2023-09-08 PROBLEM — G57.61 MORTON'S NEUROMA OF RIGHT FOOT: Status: ACTIVE | Noted: 2023-09-08

## 2023-09-08 PROBLEM — D72.819 LEUKOPENIA: Status: ACTIVE | Noted: 2023-09-08

## 2023-09-08 PROBLEM — N40.2 PROSTATE NODULE: Status: ACTIVE | Noted: 2023-09-08

## 2023-09-08 PROBLEM — K63.5 POLYP OF COLON: Status: ACTIVE | Noted: 2022-06-08

## 2023-09-08 PROBLEM — M25.50 JOINT PAIN: Status: ACTIVE | Noted: 2023-09-08

## 2023-09-08 PROBLEM — M43.19 SPONDYLOLISTHESIS, MULTIPLE SITES IN SPINE: Status: ACTIVE | Noted: 2023-09-08

## 2023-09-08 PROBLEM — M48.00 CENTRAL STENOSIS OF SPINAL CANAL: Status: ACTIVE | Noted: 2023-09-08

## 2023-09-08 PROCEDURE — 1036F TOBACCO NON-USER: CPT | Performed by: INTERNAL MEDICINE

## 2023-09-08 PROCEDURE — 1159F MED LIST DOCD IN RCRD: CPT | Performed by: INTERNAL MEDICINE

## 2023-09-08 PROCEDURE — 1160F RVW MEDS BY RX/DR IN RCRD: CPT | Performed by: INTERNAL MEDICINE

## 2023-09-08 PROCEDURE — 3074F SYST BP LT 130 MM HG: CPT | Performed by: INTERNAL MEDICINE

## 2023-09-08 PROCEDURE — 99204 OFFICE O/P NEW MOD 45 MIN: CPT | Performed by: INTERNAL MEDICINE

## 2023-09-08 PROCEDURE — 3078F DIAST BP <80 MM HG: CPT | Performed by: INTERNAL MEDICINE

## 2023-09-08 PROCEDURE — 1126F AMNT PAIN NOTED NONE PRSNT: CPT | Performed by: INTERNAL MEDICINE

## 2023-09-08 RX ORDER — OMEPRAZOLE 20 MG/1
20 CAPSULE, DELAYED RELEASE ORAL
COMMUNITY

## 2023-09-08 RX ORDER — ALPHA LIPOIC ACID 300 MG
CAPSULE ORAL
COMMUNITY

## 2023-09-08 RX ORDER — GABAPENTIN 300 MG/1
CAPSULE ORAL
COMMUNITY
End: 2024-05-14 | Stop reason: SDUPTHER

## 2023-09-08 RX ORDER — AMOXICILLIN AND CLAVULANATE POTASSIUM 875; 125 MG/1; MG/1
875 TABLET, FILM COATED ORAL 2 TIMES DAILY
Qty: 20 TABLET | Refills: 0 | Status: SHIPPED | OUTPATIENT
Start: 2023-09-08 | End: 2023-09-18

## 2023-09-08 RX ORDER — VITAMIN B COMPLEX
1 CAPSULE ORAL
COMMUNITY

## 2023-09-08 RX ORDER — PREDNISONE 20 MG/1
40 TABLET ORAL DAILY
Qty: 10 TABLET | Refills: 0 | Status: SHIPPED | OUTPATIENT
Start: 2023-09-08 | End: 2023-09-13

## 2023-09-08 ASSESSMENT — PATIENT HEALTH QUESTIONNAIRE - PHQ9
SUM OF ALL RESPONSES TO PHQ9 QUESTIONS 1 AND 2: 0
2. FEELING DOWN, DEPRESSED OR HOPELESS: NOT AT ALL
1. LITTLE INTEREST OR PLEASURE IN DOING THINGS: NOT AT ALL

## 2023-09-08 ASSESSMENT — ENCOUNTER SYMPTOMS
SHORTNESS OF BREATH: 1
COUGH: 1

## 2023-09-08 NOTE — PROGRESS NOTES
Patient ID:   Luis Bain is a 78 y.o. male with PMH remarkable for lumbar radiculopathy, HTN, SBO s/p surgery, BPH, prostate nodule, memory issue who presents to the office today for switching providers and Flu Vaccine (Declined at this time due to congestion and cough with greenish sputum).    HEALTH MAINTENANCE: Establish Care  Previous PCP: Dr Caitlin MD  Smoking: Never a Smoker  Labs: 5/5/2023  PSA: 2.6 on 5/5/2023  Colonoscopy (45-75) + EGD: 6/8/2022 showed 40mm cecum polyp, diverticula in sigmoid colon.   - complaints of congestion, productive cough with green sputum.  - woke up 2.5 wks ago with cough  - picked up some Mucinex DM and has been taking that.   - has been active, denies SOB, chest heaviness.  - recently on vacation with another couple and the male was ill with a cough, and nasal congestion. He developed some of this after the vacation  - wakes up ~2x per night to urinate.   - denies bowel issues.   - had an MRI of brain 7/5/2023 due to dysphagia and showed age related volume loss.  - leaving for [place] around New Years  - will get 2v CxR today  - start on antibiotics    SOCIAL HISTORY:  Social History     Tobacco Use    Smoking status: Never    Smokeless tobacco: Never   Vaping Use    Vaping Use: Never used   Substance Use Topics    Alcohol use: Not Currently    Drug use: Never     REVIEW OF SYSTEMS:  Review of Systems   Respiratory:  Positive for cough and shortness of breath.    All other systems reviewed and are negative.    ALLERGIES:  Allergies   Allergen Reactions    Succinylcholine Chloride Other and Unknown     Delayed Awakening    Pt states age 41s had surgery took over 3 hours to wake, heard people talking, could not move or respond. Had no further testing or eval, had no problem waking since that incident.      VITAL SIGNS:  Vitals:    09/08/23 1016   BP: 111/74   Pulse: 85   SpO2: 95%     Physical Exam  Vitals reviewed.   Constitutional:       General: He is not in acute  distress.     Appearance: Normal appearance. He is underweight. He is not ill-appearing.   HENT:      Head: Normocephalic and atraumatic.      Right Ear: Tympanic membrane and external ear normal.      Left Ear: Tympanic membrane and external ear normal.      Nose: Nose normal.      Mouth/Throat:      Mouth: Mucous membranes are moist.      Pharynx: Oropharynx is clear.   Eyes:      Conjunctiva/sclera: Conjunctivae normal.      Pupils: Pupils are equal, round, and reactive to light.   Cardiovascular:      Rate and Rhythm: Normal rate and regular rhythm.      Heart sounds: Murmur heard.   Pulmonary:      Effort: Pulmonary effort is normal. No respiratory distress.      Breath sounds: Decreased breath sounds and rhonchi present. No wheezing.   Abdominal:      General: There is no distension.      Palpations: Abdomen is soft. There is no mass.      Tenderness: There is no abdominal tenderness.   Musculoskeletal:         General: Normal range of motion.      Cervical back: Normal range of motion and neck supple.   Skin:     General: Skin is warm and dry.      Comments: Varicose veins BLE   Neurological:      General: No focal deficit present.      Mental Status: He is alert and oriented to person, place, and time.      Sensory: No sensory deficit.      Motor: No weakness.      Coordination: Coordination normal.      Gait: Gait normal.   Psychiatric:         Mood and Affect: Mood normal.         Behavior: Behavior normal.     MEDICATIONS:  Current Outpatient Medications on File Prior to Visit   Medication Sig Dispense Refill    alpha lipoic acid 300 mg capsule Take by mouth.      b complex vitamins capsule Take 1 capsule by mouth.      coenzyme Q-10 100 mg capsule Take 1 capsule (100 mg) by mouth 2 times a day.      finasteride (Proscar) 5 mg tablet TAKE 1 TABLET BY MOUTH ONCE  DAILY 90 tablet 0    gabapentin (Neurontin) 300 mg capsule TAKE 1 CAPSULE BY MOUTH EVERY NIGHT AT BEDTIME. IF TOLERATING WELL. MAY INCREASE TO 2  TABLET EVERY NIGHT AT BEDTIME      L. acidophilus/Bifid. animalis 32 billion cell capsule Take 1 capsule by mouth once daily.      losartan (Cozaar) 100 mg tablet TAKE 1 TABLET BY MOUTH ONCE  DAILY 90 tablet 0    magnesium citrate 100 mg capsule Take 100 mg by mouth early in the morning..      meclizine (Antivert) 12.5 mg tablet Take 1 tablet (12.5 mg) by mouth 3 times a day as needed.      omeprazole (PriLOSEC) 20 mg DR capsule Take 1 capsule (20 mg) by mouth.      simvastatin (Zocor) 10 mg tablet TAKE 1 TABLET BY MOUTH ONCE  DAILY AT BEDTIME 90 tablet 0    tadalafil (Cialis) 5 mg tablet Take 1 tablet (5 mg) by mouth once daily.      lansoprazole (Prevacid) 30 mg DR capsule Take 1 capsule (30 mg) by mouth once daily.      losartan (Cozaar) 100 mg tablet Take 1 tablet (100 mg) by mouth once daily. 30 tablet 1    ondansetron ODT (Zofran-ODT) 4 mg disintegrating tablet Take 1 tablet (4 mg) by mouth every 8 hours if needed.      valsartan (Diovan) 160 mg tablet Take 1 tablet (160 mg) by mouth once daily.      [DISCONTINUED] pregabalin (Lyrica) 50 mg capsule Take 1 capsule (50 mg) by mouth 2 times a day.       No current facility-administered medications on file prior to visit.      LABORATORY DATA:  Lab Results   Component Value Date    WBC 4.9 05/05/2023    HGB 14.0 05/05/2023    HCT 43.9 05/05/2023     05/05/2023    CHOL 180 05/05/2023    TRIG 62 05/05/2023    HDL 60.7 05/05/2023    ALT 11 06/29/2023    AST 17 06/29/2023     06/29/2023    K 4.7 06/29/2023     06/29/2023    CREATININE 0.96 06/29/2023    BUN 17 06/29/2023    CO2 30 06/29/2023    TSH 3.38 05/05/2023    PSA 2.6 05/05/2023    HGBA1C 5.4 06/06/2022     ASSESSMENT AND PLAN:  Assessment/Plan   Diagnoses and all orders for this visit:  Health care maintenance  Asymptomatic varicose veins of both lower extremities  Comments:  - let us know if you are interested in seeing a vein specialist.  Lumbar spondylolysis  -     Referral to Physical  Therapy; Future  Chronic pain of right knee  -     Referral to Physical Therapy; Future  Upper respiratory tract infection, unspecified type  -     XR chest 2 views; Future  -     amoxicillin-pot clavulanate (Augmentin) 875-125 mg tablet; Take 1 tablet (875 mg) by mouth 2 times a day for 10 days.  -     predniSONE (Deltasone) 20 mg tablet; Take 2 tablets (40 mg) by mouth once daily for 5 days. Take in the morning.  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      --------------------  Written by Rose Anderson RN, acting as a scribe for Dr. Vora. This note accurately reflects the work and decisions made by Dr. Vora.     I, Dr. Vora, attest all medical record entries made by the scribe were under my direction and were personally dictated by me. I have reviewed the chart and agree that the record accurately reflects my performance of the history, physical exam, and assessment and plan.

## 2023-09-08 NOTE — PATIENT INSTRUCTIONS
Antihistamine: (pick ONE) helps to relieve the symptoms from allergies.  *Loratidine (Alavert, Claritin) 10mg one time per day   Cetirizine (Zyrtec) 10mg one time per day   Fexofenadine (Allegra) 180mg one time per day or 60mg two times per day    Dr. Vora does NOT recommend getting any medications with decongestant in it (example: Mucinex-DM, Zyrtec-D, Sudafed PE, Claritin-D, etc) due to common side effects of raising your blood pressure, causing heart palpitation, insomnia, nervousness, and etc.  Sometimes people find that antihistamine medications make them feel  tired. If this is the case, just take it at night before bed.  If you are prescribed steroids during this visit (ex. Prednisone), then please avoid any NSAID's (ex. Ibuprofen, Naproxen, Aleve) until you have completed the course of the steroid.  Call the office with any questions / concerns [phone].

## 2023-10-09 DIAGNOSIS — N52.9 ERECTILE DYSFUNCTION, UNSPECIFIED ERECTILE DYSFUNCTION TYPE: Primary | ICD-10-CM

## 2023-10-09 PROBLEM — K56.609 SMALL BOWEL OBSTRUCTION (MULTI): Status: ACTIVE | Noted: 2023-10-09

## 2023-10-09 PROBLEM — H91.90 HEARING LOSS: Status: ACTIVE | Noted: 2019-01-24

## 2023-10-09 PROBLEM — M54.16 LUMBAR RADICULOPATHY: Status: ACTIVE | Noted: 2023-07-10

## 2023-10-10 RX ORDER — TADALAFIL 5 MG/1
5 TABLET ORAL DAILY
Qty: 10 TABLET | Refills: 0 | Status: SHIPPED | OUTPATIENT
Start: 2023-10-10 | End: 2023-11-21 | Stop reason: WASHOUT

## 2023-10-31 DIAGNOSIS — N40.0 BENIGN PROSTATIC HYPERPLASIA WITHOUT LOWER URINARY TRACT SYMPTOMS: ICD-10-CM

## 2023-10-31 DIAGNOSIS — I10 PRIMARY HYPERTENSION: ICD-10-CM

## 2023-10-31 DIAGNOSIS — E78.5 DYSLIPIDEMIA: ICD-10-CM

## 2023-10-31 RX ORDER — SIMVASTATIN 10 MG/1
10 TABLET, FILM COATED ORAL NIGHTLY
Qty: 90 TABLET | Refills: 0 | Status: SHIPPED | OUTPATIENT
Start: 2023-10-31 | End: 2024-01-09

## 2023-10-31 RX ORDER — FINASTERIDE 5 MG/1
5 TABLET, FILM COATED ORAL DAILY
Qty: 90 TABLET | Refills: 0 | Status: SHIPPED | OUTPATIENT
Start: 2023-10-31 | End: 2024-01-09

## 2023-10-31 RX ORDER — LOSARTAN POTASSIUM 100 MG/1
100 TABLET ORAL
Qty: 90 TABLET | Refills: 0 | Status: SHIPPED | OUTPATIENT
Start: 2023-10-31 | End: 2024-01-09

## 2023-11-21 ENCOUNTER — OFFICE VISIT (OUTPATIENT)
Dept: OTOLARYNGOLOGY | Facility: CLINIC | Age: 78
End: 2023-11-21
Payer: MEDICARE

## 2023-11-21 DIAGNOSIS — R05.9 COUGH, UNSPECIFIED TYPE: Primary | ICD-10-CM

## 2023-11-21 PROCEDURE — 99204 OFFICE O/P NEW MOD 45 MIN: CPT | Performed by: OTOLARYNGOLOGY

## 2023-11-21 PROCEDURE — 1126F AMNT PAIN NOTED NONE PRSNT: CPT | Performed by: OTOLARYNGOLOGY

## 2023-11-21 PROCEDURE — 1159F MED LIST DOCD IN RCRD: CPT | Performed by: OTOLARYNGOLOGY

## 2023-11-21 PROCEDURE — 1160F RVW MEDS BY RX/DR IN RCRD: CPT | Performed by: OTOLARYNGOLOGY

## 2023-11-21 PROCEDURE — 1036F TOBACCO NON-USER: CPT | Performed by: OTOLARYNGOLOGY

## 2023-11-21 RX ORDER — CLARITHROMYCIN 500 MG/1
500 TABLET, FILM COATED ORAL 2 TIMES DAILY
Qty: 20 TABLET | Refills: 0 | Status: SHIPPED | OUTPATIENT
Start: 2023-11-21 | End: 2023-12-01

## 2023-11-21 RX ORDER — ALBUTEROL SULFATE 4 MG/1
4 TABLET ORAL 3 TIMES DAILY
Qty: 30 TABLET | Refills: 0 | Status: SHIPPED | OUTPATIENT
Start: 2023-11-21 | End: 2024-04-22 | Stop reason: ALTCHOICE

## 2023-11-21 ASSESSMENT — ENCOUNTER SYMPTOMS
DEPRESSION: 0
RHINORRHEA: 1
SWEATS: 0
WHEEZING: 1
WEIGHT LOSS: 0
COUGH: 1
CHILLS: 0
HEARTBURN: 0
HEADACHES: 0
OCCASIONAL FEELINGS OF UNSTEADINESS: 0
SORE THROAT: 0
SHORTNESS OF BREATH: 0
FEVER: 0
HEMOPTYSIS: 0
MYALGIAS: 0
LOSS OF SENSATION IN FEET: 0

## 2023-11-21 NOTE — LETTER
November 25, 2023     Alize Hurley MD  [address] [address]    Patient: Luis Bain   YOB: 1945   Date of Visit: 11/21/2023       Dear Dr. Alize Hurley MD:    Thank you for referring Luis Bain to me for evaluation. Below are my notes for this consultation.  If you have questions, please do not hesitate to call me. I look forward to following your patient along with you.       Sincerely,     Jessica Holcomb MD      CC: No Recipients  ______________________________________________________________________________________    History Of Present Illness  Luis Bain is a 78 y.o. male presents for Cough. He has it for 2.5 months. He has tried multiple medications so far.  Little bit of phlegm. He is fine in the morning. Around 2 pm, in the afternoon he starts to cough and it continues until he goes to bed. He is not coughing in his sleep.     He has tried oral antibiotic, steroids, allergy meds, otc cough meds.  He still has the cough.  The cough comes form his chest. No cough triggering points in neck.  Nasal septum deviated to right.  No postnasal visible discharge.  Tonsillectomy (+).    My impression, patient has a lower respiratory problem causing his cough, it is not a throat issue.    Recommendations  1- clarithromycin 500 mg tab  2- albuterol tab  3- follow up with Dr. Vora, or with pulmonary medicine    Past Medical History  He has a past medical history of Personal history of other specified conditions (08/12/2020).    Surgical History  He has a past surgical history that includes Other surgical history (08/12/2020).     Social History  He reports that he has never smoked. He has never used smokeless tobacco. He reports that he does not currently use alcohol. He reports that he does not use drugs.    Family History  No family history on file.     Allergies  Succinylcholine chloride    Review of Systems   Coughing up sputum     Physical Exam    General  appearance: Healthy-appearing, well-nourished, well groomed, in no acute distress.     Head and Face: Atraumatic with no masses, lesions, or scarring.      Salivary glands: No tenderness of the parotid glands or parotid masses.     No tenderness of the submandibular glands or submandibular masses.      Facial strength: Normal strength and symmetry, no synkinesis or facial tic.     Eyes: Conjunctivas look non-hyperemic bilaterally    Ears: Bilaterally ear canals look normal. Tympanic membranes look intact, no hyperemia, fluid or retraction. Hearing grossly normal.      Nose: Mucosa looks normal. No purulent discharge.     Oral Cavity/Mouth: Lips and tongue look normal.     Throat: No postnasal discharge. No tonsil hypertrophy. No hyperemia.    Neck: Symmetrical, trachea midline.     Pulmonary: Normal respiratory effort.     Lymphatic: No palpable pathologic lymph nodes at neck.     Neurological/Psychiatric Orientation to person, place, and time: Normal.     Mood and affect: Normal.      Extremities: No clubbing.     Skin: No significant skin lesions were noted at face or neck        Procedure  none     Last Recorded Vitals  There were no vitals taken for this visit.    Relevant Results  Prior to Admission medications    Medication Sig Start Date End Date Taking? Authorizing Provider   alpha lipoic acid 300 mg capsule Take by mouth.    Historical Provider, MD   b complex vitamins capsule Take 1 capsule by mouth.    Historical Provider, MD   coenzyme Q-10 100 mg capsule Take 1 capsule (100 mg) by mouth 2 times a day. 8/12/20   Historical Provider, MD   finasteride (Proscar) 5 mg tablet Take 1 tablet (5 mg) by mouth once daily. Do not crush, chew, or split. 10/31/23   Alize Hurley MD   gabapentin (Neurontin) 300 mg capsule TAKE 1 CAPSULE BY MOUTH EVERY NIGHT AT BEDTIME. IF TOLERATING WELL. MAY INCREASE TO 2 TABLET EVERY NIGHT AT BEDTIME    Historical Provider, MD   L. acidophilus/Bifid. animalis 32 billion cell  capsule Take 1 capsule by mouth once daily. 8/12/20   Historical Provider, MD   lansoprazole (Prevacid) 30 mg DR capsule Take 1 capsule (30 mg) by mouth once daily. 7/7/15   Historical Provider, MD   losartan (Cozaar) 100 mg tablet TAKE 1 TABLET BY MOUTH ONCE  DAILY 6/19/23   Oren Tucker MD   losartan (Cozaar) 100 mg tablet Take 1 tablet (100 mg) by mouth once daily. 10/31/23 1/29/24  Alize Hurley MD   magnesium citrate 100 mg capsule Take 100 mg by mouth early in the morning..    Historical Provider, MD   meclizine (Antivert) 12.5 mg tablet Take 1 tablet (12.5 mg) by mouth 3 times a day as needed. 8/2/22   Historical Provider, MD   omeprazole (PriLOSEC) 20 mg DR capsule Take 1 capsule (20 mg) by mouth.    Historical Provider, MD   ondansetron ODT (Zofran-ODT) 4 mg disintegrating tablet Take 1 tablet (4 mg) by mouth every 8 hours if needed. 7/12/22   Historical Provider, MD   simvastatin (Zocor) 10 mg tablet Take 1 tablet (10 mg) by mouth once daily at bedtime. 10/31/23   Alize Hurley MD   tadalafil (Cialis) 5 mg tablet Take 1 tablet (5 mg) by mouth once daily. 10/10/23   Alize Hurley MD   valsartan (Diovan) 160 mg tablet Take 1 tablet (160 mg) by mouth once daily.    Historical Provider, MD     No results found.      Assessment/Plan  Luis Bain is a 78 y.o. male presents for Cough. He has it for 2.5 months. He has tried multiple medications so far.  Little bit of phlegm. He is fine in the morning. Around 2 pm, in the afternoon he starts to cough and it continues until he goes to bed. He is not coughing in his sleep.     He has tried oral antibiotic, sterids, allergy meds, otc cough meds.  He still has the cough.  The cough comes form his chest. No cough triggering points in neck.  Nasal septum deviated to right  No postnasal visible dischrge  Tonsillectomy (+).    My impression, patient has a lower respiratory problem causing his cough, it is not a throat issue.    Recommendations  1-  clarithromycin 500 mg tab  2- albuterol tab  3- follow up with Dr. Vora, or with pulmonary medicine         Jessica Holcomb MD  Otolaryngology - Head & Neck Surgery

## 2023-11-21 NOTE — PROGRESS NOTES
History Of Present Illness  Luis Bain is a 78 y.o. male presents for Cough. He has it for 2.5 months. He has tried multiple medications so far.  Little bit of phlegm. He is fine in the morning. Around 2 pm, in the afternoon he starts to cough and it continues until he goes to bed. He is not coughing in his sleep.     He has tried oral antibiotic, sterids, allergy meds, otc cough meds.  He still has the cough.  The cough comes form his chest. No cough triggering points in neck.  Nasal septum deviated to right  No postnasal visible dischrge  Tonsillectomy (+).    My impression, patient has a lower respiratory problem causing his cough, it is not a throat issue.    Recommendations  1- clarithromycin 500 mg tab  2- albuterol tab  3- follow up with Dr. Vora, or with pulmonary medicine    Past Medical History  He has a past medical history of Personal history of other specified conditions (08/12/2020).    Surgical History  He has a past surgical history that includes Other surgical history (08/12/2020).     Social History  He reports that he has never smoked. He has never used smokeless tobacco. He reports that he does not currently use alcohol. He reports that he does not use drugs.    Family History  No family history on file.     Allergies  Succinylcholine chloride    Review of Systems   Coughing up sputum     Physical Exam    General appearance: Healthy-appearing, well-nourished, well groomed, in no acute distress.     Head and Face: Atraumatic with no masses, lesions, or scarring.      Salivary glands: No tenderness of the parotid glands or parotid masses.     No tenderness of the submandibular glands or submandibular masses.      Facial strength: Normal strength and symmetry, no synkinesis or facial tic.     Eyes: Conjunctivas look non-hyperemic bilaterally    Ears: Bilaterally ear canals look normal. Tympanic membranes look intact, no hyperemia, fluid or retraction. Hearing grossly normal.      Nose: Mucosa  looks normal. No purulent discharge.     Oral Cavity/Mouth: Lips and tongue look normal.     Throat: No postnasal discharge. No tonsil hypertrophy. No hyperemia.    Neck: Symmetrical, trachea midline.     Pulmonary: Normal respiratory effort.     Lymphatic: No palpable pathologic lymph nodes at neck.     Neurological/Psychiatric Orientation to person, place, and time: Normal.     Mood and affect: Normal.      Extremities: No clubbing.     Skin: No significant skin lesions were noted at face or neck        Procedure       Last Recorded Vitals  There were no vitals taken for this visit.    Relevant Results  Prior to Admission medications    Medication Sig Start Date End Date Taking? Authorizing Provider   alpha lipoic acid 300 mg capsule Take by mouth.    Historical Provider, MD   b complex vitamins capsule Take 1 capsule by mouth.    Historical Provider, MD   coenzyme Q-10 100 mg capsule Take 1 capsule (100 mg) by mouth 2 times a day. 8/12/20   Historical Provider, MD   finasteride (Proscar) 5 mg tablet Take 1 tablet (5 mg) by mouth once daily. Do not crush, chew, or split. 10/31/23   Alize Hurley MD   gabapentin (Neurontin) 300 mg capsule TAKE 1 CAPSULE BY MOUTH EVERY NIGHT AT BEDTIME. IF TOLERATING WELL. MAY INCREASE TO 2 TABLET EVERY NIGHT AT BEDTIME    Historical Provider, MD   L. acidophilus/Bifid. animalis 32 billion cell capsule Take 1 capsule by mouth once daily. 8/12/20   Historical Provider, MD   lansoprazole (Prevacid) 30 mg DR capsule Take 1 capsule (30 mg) by mouth once daily. 7/7/15   Historical Provider, MD   losartan (Cozaar) 100 mg tablet TAKE 1 TABLET BY MOUTH ONCE  DAILY 6/19/23   Oren Tucker MD   losartan (Cozaar) 100 mg tablet Take 1 tablet (100 mg) by mouth once daily. 10/31/23 1/29/24  Alize Hurley MD   magnesium citrate 100 mg capsule Take 100 mg by mouth early in the morning..    Historical Provider, MD   meclizine (Antivert) 12.5 mg tablet Take 1 tablet (12.5 mg) by mouth 3  times a day as needed. 8/2/22   Historical Provider, MD   omeprazole (PriLOSEC) 20 mg DR capsule Take 1 capsule (20 mg) by mouth.    Historical Provider, MD   ondansetron ODT (Zofran-ODT) 4 mg disintegrating tablet Take 1 tablet (4 mg) by mouth every 8 hours if needed. 7/12/22   Historical Provider, MD   simvastatin (Zocor) 10 mg tablet Take 1 tablet (10 mg) by mouth once daily at bedtime. 10/31/23   Alize Hurley MD   tadalafil (Cialis) 5 mg tablet Take 1 tablet (5 mg) by mouth once daily. 10/10/23   Alize Hurley MD   valsartan (Diovan) 160 mg tablet Take 1 tablet (160 mg) by mouth once daily.    Historical Provider, MD     No results found.      Assessment/Plan   Luis Bain is a 78 y.o. male presents for Cough. He has it for 2.5 months. He has tried multiple medications so far.  Little bit of phlegm. He is fine in the morning. Around 2 pm, in the afternoon he starts to cough and it continues until he goes to bed. He is not coughing in his sleep.     He has tried oral antibiotic, sterids, allergy meds, otc cough meds.  He still has the cough.  The cough comes form his chest. No cough triggering points in neck.  Nasal septum deviated to right  No postnasal visible dischrge  Tonsillectomy (+).    My impression, patient has a lower respiratory problem causing his cough, it is not a throat issue.    Recommendations  1- clarithromycin 500 mg tab  2- albuterol tab  3- follow up with Dr. Vora, or with pulmonary medicine         Jessica Holcomb MD  Otolaryngology - Head & Neck Surgery

## 2024-01-09 DIAGNOSIS — I10 PRIMARY HYPERTENSION: ICD-10-CM

## 2024-01-09 DIAGNOSIS — N40.0 BENIGN PROSTATIC HYPERPLASIA WITHOUT LOWER URINARY TRACT SYMPTOMS: ICD-10-CM

## 2024-01-09 DIAGNOSIS — E78.5 DYSLIPIDEMIA: ICD-10-CM

## 2024-01-09 RX ORDER — FINASTERIDE 5 MG/1
TABLET, FILM COATED ORAL
Qty: 90 TABLET | Refills: 0 | Status: SHIPPED | OUTPATIENT
Start: 2024-01-09 | End: 2024-03-26

## 2024-01-09 RX ORDER — LOSARTAN POTASSIUM 100 MG/1
100 TABLET ORAL DAILY
Qty: 90 TABLET | Refills: 0 | Status: SHIPPED | OUTPATIENT
Start: 2024-01-09 | End: 2024-03-26

## 2024-01-09 RX ORDER — SIMVASTATIN 10 MG/1
10 TABLET, FILM COATED ORAL NIGHTLY
Qty: 90 TABLET | Refills: 0 | Status: SHIPPED | OUTPATIENT
Start: 2024-01-09 | End: 2024-03-26

## 2024-02-26 DIAGNOSIS — Z12.11 ENCOUNTER FOR SCREENING COLONOSCOPY: ICD-10-CM

## 2024-03-26 DIAGNOSIS — N40.0 BENIGN PROSTATIC HYPERPLASIA WITHOUT LOWER URINARY TRACT SYMPTOMS: ICD-10-CM

## 2024-03-26 DIAGNOSIS — I10 PRIMARY HYPERTENSION: ICD-10-CM

## 2024-03-26 DIAGNOSIS — E78.5 DYSLIPIDEMIA: ICD-10-CM

## 2024-03-26 RX ORDER — LOSARTAN POTASSIUM 100 MG/1
100 TABLET ORAL DAILY
Qty: 90 TABLET | Refills: 0 | Status: SHIPPED | OUTPATIENT
Start: 2024-03-26 | End: 2024-04-22 | Stop reason: ALTCHOICE

## 2024-03-26 RX ORDER — SIMVASTATIN 10 MG/1
10 TABLET, FILM COATED ORAL NIGHTLY
Qty: 90 TABLET | Refills: 0 | Status: SHIPPED | OUTPATIENT
Start: 2024-03-26

## 2024-03-26 RX ORDER — FINASTERIDE 5 MG/1
TABLET, FILM COATED ORAL
Qty: 90 TABLET | Refills: 0 | Status: SHIPPED | OUTPATIENT
Start: 2024-03-26

## 2024-04-19 PROBLEM — R05.8 OTHER SPECIFIED COUGH: Status: RESOLVED | Noted: 2023-09-08 | Resolved: 2024-04-19

## 2024-04-22 ENCOUNTER — OFFICE VISIT (OUTPATIENT)
Dept: PRIMARY CARE | Facility: CLINIC | Age: 79
End: 2024-04-22
Payer: MEDICARE

## 2024-04-22 VITALS
HEIGHT: 71 IN | BODY MASS INDEX: 19.88 KG/M2 | OXYGEN SATURATION: 98 % | DIASTOLIC BLOOD PRESSURE: 74 MMHG | SYSTOLIC BLOOD PRESSURE: 123 MMHG | HEART RATE: 79 BPM | WEIGHT: 142 LBS

## 2024-04-22 DIAGNOSIS — I10 HYPERTENSION, UNSPECIFIED TYPE: ICD-10-CM

## 2024-04-22 DIAGNOSIS — R05.9 COUGH, UNSPECIFIED TYPE: ICD-10-CM

## 2024-04-22 DIAGNOSIS — E78.5 HYPERLIPIDEMIA, UNSPECIFIED HYPERLIPIDEMIA TYPE: ICD-10-CM

## 2024-04-22 DIAGNOSIS — G31.01 PRIMARY PROGRESSIVE APHASIA (MULTI): Primary | ICD-10-CM

## 2024-04-22 DIAGNOSIS — R01.1 CARDIAC MURMUR: ICD-10-CM

## 2024-04-22 DIAGNOSIS — Z12.5 PROSTATE CANCER SCREENING: ICD-10-CM

## 2024-04-22 DIAGNOSIS — R79.89 LOW VITAMIN B12 LEVEL: ICD-10-CM

## 2024-04-22 DIAGNOSIS — F02.80 PRIMARY PROGRESSIVE APHASIA (MULTI): Primary | ICD-10-CM

## 2024-04-22 PROCEDURE — 1126F AMNT PAIN NOTED NONE PRSNT: CPT | Performed by: INTERNAL MEDICINE

## 2024-04-22 PROCEDURE — 1159F MED LIST DOCD IN RCRD: CPT | Performed by: INTERNAL MEDICINE

## 2024-04-22 PROCEDURE — 99214 OFFICE O/P EST MOD 30 MIN: CPT | Performed by: INTERNAL MEDICINE

## 2024-04-22 PROCEDURE — 3078F DIAST BP <80 MM HG: CPT | Performed by: INTERNAL MEDICINE

## 2024-04-22 PROCEDURE — 1036F TOBACCO NON-USER: CPT | Performed by: INTERNAL MEDICINE

## 2024-04-22 PROCEDURE — 3074F SYST BP LT 130 MM HG: CPT | Performed by: INTERNAL MEDICINE

## 2024-04-22 PROCEDURE — 1160F RVW MEDS BY RX/DR IN RCRD: CPT | Performed by: INTERNAL MEDICINE

## 2024-04-22 RX ORDER — SILDENAFIL 100 MG/1
100 TABLET, FILM COATED ORAL AS NEEDED
COMMUNITY
Start: 2024-02-29

## 2024-04-22 RX ORDER — AMLODIPINE BESYLATE 2.5 MG/1
2.5 TABLET ORAL DAILY
Qty: 30 TABLET | Refills: 0 | Status: SHIPPED | OUTPATIENT
Start: 2024-04-22 | End: 2024-05-13 | Stop reason: SDUPTHER

## 2024-04-22 RX ORDER — ACETAMINOPHEN 500 MG
1 TABLET ORAL 3 TIMES WEEKLY
Qty: 1 EACH | Refills: 0 | Status: SHIPPED | OUTPATIENT
Start: 2024-04-22

## 2024-04-22 ASSESSMENT — ENCOUNTER SYMPTOMS
COUGH: 1
CARDIOVASCULAR NEGATIVE: 1
NEUROLOGICAL NEGATIVE: 1
MUSCULOSKELETAL NEGATIVE: 1
PSYCHIATRIC NEGATIVE: 1
GASTROINTESTINAL NEGATIVE: 1
CONSTITUTIONAL NEGATIVE: 1

## 2024-04-22 ASSESSMENT — PATIENT HEALTH QUESTIONNAIRE - PHQ9
SUM OF ALL RESPONSES TO PHQ9 QUESTIONS 1 AND 2: 0
1. LITTLE INTEREST OR PLEASURE IN DOING THINGS: NOT AT ALL
2. FEELING DOWN, DEPRESSED OR HOPELESS: NOT AT ALL

## 2024-04-22 ASSESSMENT — PAIN SCALES - GENERAL: PAINLEVEL: 0-NO PAIN

## 2024-04-22 NOTE — PATIENT INSTRUCTIONS
It was great to see you in the office today! Here is what we discussed at your visit today:  Stop taking Losartan as discussed  We have sent in prescription for Amlodipine 2.5mg daily, to start tomorrow  Monitor your BP at home 2-3 times per week  after about one week, and call us with results after 3 weeks and we will go from there; also at that time, inform us how your cough   A referral was placed for an ultrasound of your heart, schedule this as soon as you are able  Please get bloodwork drawn as soon as you are able. We will call you with results.  Follow up in four months

## 2024-04-22 NOTE — PROGRESS NOTES
Patient ID: He states he had a colonoscopy last week that was ok. He states that since September he has had cough and congestion. He states that his cough and congestion have improved with antibiotics. He states that he is constantly is blowing his nose, and he is still coughing occasionally. He states that he still has cough at times, but his biggest problem is phlegm, but it is always clear. He states he walks a lot, at at good pace and he has no SOB on exertion.     HEALTH MAINTENANCE: Follow Up  Last Office Visit: 9/8/23  Smoking: Never a Smoker  Labs: 5/5/2023  PSA: 2.6 on 5/5/2023  Colonoscopy (45-75) + EGD: 6/8/2022 showed 40mm cecum polyp, diverticula in sigmoid colon.   MRI of brain 7/5/2023 due to dysphagia and showed age related volume loss.     HPI Luis Bain is a 78 y.o. male with PMH remarkable for lumbar radiculopathy, HTN, SBO s/p surgery, BPH, prostate nodule, memory issue  who presents to the office today for follow up. He was most recently seen in the office on 09/08/23, had complaints of congestion, productive cough with sputum, was started on ATB's, with Augmentin and steroids,  CXR obtained.  CXR on 09/08/23 was normal. He was referred to PT at that time for lumbar spondylosis and chronic right knee pain. He was seen by Dr. Holcomb for cough on 11/21/23, and per his note, he felt cough was 2/2 lower respiratory problem vs throat issue, prescribed Clarithromycin and Albuterol.     Social History     Tobacco Use    Smoking status: Never    Smokeless tobacco: Never   Vaping Use    Vaping status: Never Used   Substance Use Topics    Alcohol use: Not Currently    Drug use: Never     Review of Systems   Constitutional: Negative.    Respiratory:  Positive for cough.    Cardiovascular: Negative.    Gastrointestinal: Negative.    Genitourinary: Negative.    Musculoskeletal: Negative.    Neurological: Negative.    Psychiatric/Behavioral: Negative.       Visit Vitals  Vitals:    04/22/24 1118   BP:  "123/74   BP Location: Right arm   Pulse: 79   SpO2: 98%   Weight: 64.4 kg (142 lb)   Height: 1.803 m (5' 11\")     Smoking Status Never     Physical Exam  Vitals reviewed.   Constitutional:       Appearance: Normal appearance.   HENT:      Head: Normocephalic and atraumatic.   Cardiovascular:      Rate and Rhythm: Normal rate and regular rhythm.      Pulses: Normal pulses.      Comments: Heart murmur heard more in apex  Pulmonary:      Effort: Pulmonary effort is normal.      Breath sounds: Normal breath sounds.   Abdominal:      General: Bowel sounds are normal.      Palpations: Abdomen is soft.   Musculoskeletal:         General: Normal range of motion.   Skin:     General: Skin is warm and dry.   Neurological:      General: No focal deficit present.      Mental Status: He is alert and oriented to person, place, and time.   Psychiatric:         Mood and Affect: Mood normal.         Behavior: Behavior normal.       Current Outpatient Medications   Medication Instructions    albuterol (PROVENTIL) 4 mg, oral, 3 times daily    alpha lipoic acid 300 mg capsule oral    b complex vitamins capsule 1 capsule, oral    coenzyme Q-10 100 mg capsule 1 capsule, oral, 2 times daily    finasteride (Proscar) 5 mg tablet TAKE 1 TABLET BY MOUTH DAILY (DO NOT CRUSH, CHEW, OR SPLIT)    gabapentin (Neurontin) 300 mg capsule TAKE 1 CAPSULE BY MOUTH EVERY NIGHT AT BEDTIME. IF TOLERATING WELL. MAY INCREASE TO 2 TABLET EVERY NIGHT AT BEDTIME    L. acidophilus/Bifid. animalis 32 billion cell capsule 1 capsule, oral, Daily    lansoprazole (PREVACID) 30 mg, oral, Daily RT    losartan (Cozaar) 100 mg tablet TAKE 1 TABLET BY MOUTH ONCE  DAILY    losartan (COZAAR) 100 mg, oral, Daily    magnesium citrate 100 mg, oral, Daily    meclizine (Antivert) 12.5 mg tablet 1 tablet, oral, 3 times daily PRN    omeprazole (PRILOSEC) 20 mg, oral    ondansetron ODT (ZOFRAN-ODT) 4 mg, oral, Every 8 hours PRN    simvastatin (ZOCOR) 10 mg, oral, Nightly    " valsartan (DIOVAN) 160 mg, oral, Daily        Lab Results   Component Value Date    WBC 4.9 05/05/2023    HGB 14.0 05/05/2023    HCT 43.9 05/05/2023     05/05/2023    CHOL 180 05/05/2023    TRIG 62 05/05/2023    HDL 60.7 05/05/2023    ALT 11 06/29/2023    AST 17 06/29/2023     06/29/2023    K 4.7 06/29/2023     06/29/2023    CREATININE 0.96 06/29/2023    BUN 17 06/29/2023    CO2 30 06/29/2023    TSH 3.38 05/05/2023    PSA 2.6 05/05/2023    HGBA1C 5.4 06/06/2022       Assessment/Plan   Luis was seen today for hypertension.  Diagnoses and all orders for this visit:  Hypertension, unspecified type  -     blood pressure test kit-large kit; 1 each 3 times a week.  -     amLODIPine (Norvasc) 2.5 mg tablet; Take 1 tablet (2.5 mg) by mouth once daily.  -     CBC and Auto Differential; Future  -     Tsh With Reflex To Free T4 If Abnormal; Future    Cough, unspecified type  -     amLODIPine (Norvasc) 2.5 mg tablet; Take 1 tablet (2.5 mg) by mouth once daily.  -     advised to stop Losartan and Monitor BP at home 2-3 times per week and call us with results after 3 weeks  -     call to inform if any improvement with cough at that time as well    Cardiac murmur  -     Transthoracic Echo (TTE) Complete; Future  -     perflutren lipid microspheres (Definity) injection 0.5-10 mL of dilution  -     sulfur hexafluoride microsphr (Lumason) injection 24.28 mg  -     perflutren protein A microsphere (Optison) injection 0.5 mL    Hyperlipidemia, unspecified hyperlipidemia type  -     Lipid panel; Future  -     Comprehensive metabolic panel; Future    Prostate cancer screening  -     Prostate Spec.Ag,Screen; Future    Low vitamin B12 level  -     Vitamin B12; Future    --------------------  Written by Jaclyn Ruiz LPN, acting as a scribe for Dr. Vora. This note accurately reflects the work and decisions made by Dr. Vora.     I, Dr. Vora, attest all medical record entries made by the scribe were under my direction and  were personally dictated by me. I have reviewed the chart and agree that the record accurately reflects my performance of the history, physical exam, and assessment and plan. \

## 2024-04-24 PROBLEM — K55.029: Status: RESOLVED | Noted: 2023-09-08 | Resolved: 2024-04-24

## 2024-04-24 PROBLEM — G31.01 PRIMARY PROGRESSIVE APHASIA (MULTI): Status: ACTIVE | Noted: 2024-04-24

## 2024-04-24 PROBLEM — F02.80 PRIMARY PROGRESSIVE APHASIA (MULTI): Status: ACTIVE | Noted: 2024-04-24

## 2024-04-30 ENCOUNTER — LAB (OUTPATIENT)
Dept: LAB | Facility: LAB | Age: 79
End: 2024-04-30
Payer: MEDICARE

## 2024-04-30 DIAGNOSIS — R79.89 LOW VITAMIN B12 LEVEL: ICD-10-CM

## 2024-04-30 DIAGNOSIS — Z12.5 PROSTATE CANCER SCREENING: ICD-10-CM

## 2024-04-30 DIAGNOSIS — I10 HYPERTENSION, UNSPECIFIED TYPE: ICD-10-CM

## 2024-04-30 DIAGNOSIS — E78.5 HYPERLIPIDEMIA, UNSPECIFIED HYPERLIPIDEMIA TYPE: ICD-10-CM

## 2024-04-30 LAB
ALBUMIN SERPL BCP-MCNC: 4.1 G/DL (ref 3.4–5)
ALP SERPL-CCNC: 49 U/L (ref 33–136)
ALT SERPL W P-5'-P-CCNC: 10 U/L (ref 10–52)
ANION GAP SERPL CALC-SCNC: 10 MMOL/L (ref 10–20)
AST SERPL W P-5'-P-CCNC: 16 U/L (ref 9–39)
BASOPHILS # BLD AUTO: 0.03 X10*3/UL (ref 0–0.1)
BASOPHILS NFR BLD AUTO: 0.6 %
BILIRUB SERPL-MCNC: 0.7 MG/DL (ref 0–1.2)
BUN SERPL-MCNC: 22 MG/DL (ref 6–23)
CALCIUM SERPL-MCNC: 9.4 MG/DL (ref 8.6–10.3)
CHLORIDE SERPL-SCNC: 104 MMOL/L (ref 98–107)
CHOLEST SERPL-MCNC: 161 MG/DL (ref 0–199)
CHOLESTEROL/HDL RATIO: 2.9
CO2 SERPL-SCNC: 31 MMOL/L (ref 21–32)
CREAT SERPL-MCNC: 0.93 MG/DL (ref 0.5–1.3)
EGFRCR SERPLBLD CKD-EPI 2021: 85 ML/MIN/1.73M*2
EOSINOPHIL # BLD AUTO: 0.05 X10*3/UL (ref 0–0.4)
EOSINOPHIL NFR BLD AUTO: 1 %
ERYTHROCYTE [DISTWIDTH] IN BLOOD BY AUTOMATED COUNT: 12.4 % (ref 11.5–14.5)
GLUCOSE SERPL-MCNC: 96 MG/DL (ref 74–99)
HCT VFR BLD AUTO: 39.1 % (ref 41–52)
HDLC SERPL-MCNC: 56.2 MG/DL
HGB BLD-MCNC: 12.9 G/DL (ref 13.5–17.5)
IMM GRANULOCYTES # BLD AUTO: 0.02 X10*3/UL (ref 0–0.5)
IMM GRANULOCYTES NFR BLD AUTO: 0.4 % (ref 0–0.9)
LDLC SERPL CALC-MCNC: 94 MG/DL
LYMPHOCYTES # BLD AUTO: 1.56 X10*3/UL (ref 0.8–3)
LYMPHOCYTES NFR BLD AUTO: 32.4 %
MCH RBC QN AUTO: 31.5 PG (ref 26–34)
MCHC RBC AUTO-ENTMCNC: 33 G/DL (ref 32–36)
MCV RBC AUTO: 96 FL (ref 80–100)
MONOCYTES # BLD AUTO: 0.51 X10*3/UL (ref 0.05–0.8)
MONOCYTES NFR BLD AUTO: 10.6 %
NEUTROPHILS # BLD AUTO: 2.65 X10*3/UL (ref 1.6–5.5)
NEUTROPHILS NFR BLD AUTO: 55 %
NON HDL CHOLESTEROL: 105 MG/DL (ref 0–149)
NRBC BLD-RTO: 0 /100 WBCS (ref 0–0)
PLATELET # BLD AUTO: 210 X10*3/UL (ref 150–450)
POTASSIUM SERPL-SCNC: 4.3 MMOL/L (ref 3.5–5.3)
PROT SERPL-MCNC: 6.2 G/DL (ref 6.4–8.2)
RBC # BLD AUTO: 4.09 X10*6/UL (ref 4.5–5.9)
SODIUM SERPL-SCNC: 141 MMOL/L (ref 136–145)
TRIGL SERPL-MCNC: 53 MG/DL (ref 0–149)
TSH SERPL-ACNC: 3.27 MIU/L (ref 0.44–3.98)
VLDL: 11 MG/DL (ref 0–40)
WBC # BLD AUTO: 4.8 X10*3/UL (ref 4.4–11.3)

## 2024-04-30 PROCEDURE — 85025 COMPLETE CBC W/AUTO DIFF WBC: CPT

## 2024-04-30 PROCEDURE — 80053 COMPREHEN METABOLIC PANEL: CPT

## 2024-04-30 PROCEDURE — 84443 ASSAY THYROID STIM HORMONE: CPT

## 2024-04-30 PROCEDURE — 82607 VITAMIN B-12: CPT

## 2024-04-30 PROCEDURE — 80061 LIPID PANEL: CPT

## 2024-04-30 PROCEDURE — 36415 COLL VENOUS BLD VENIPUNCTURE: CPT

## 2024-04-30 PROCEDURE — G0103 PSA SCREENING: HCPCS

## 2024-05-01 LAB
PSA SERPL-MCNC: 2.39 NG/ML
VIT B12 SERPL-MCNC: 709 PG/ML (ref 211–911)

## 2024-05-09 ENCOUNTER — HOSPITAL ENCOUNTER (OUTPATIENT)
Dept: CARDIOLOGY | Facility: HOSPITAL | Age: 79
Discharge: HOME | End: 2024-05-09
Payer: MEDICARE

## 2024-05-09 DIAGNOSIS — R01.1 CARDIAC MURMUR: ICD-10-CM

## 2024-05-09 PROCEDURE — 93306 TTE W/DOPPLER COMPLETE: CPT

## 2024-05-10 LAB
AORTIC VALVE PEAK VELOCITY: 1.33 M/S
AV PEAK GRADIENT: 7.1 MMHG
AVA (PEAK VEL): 3.34 CM2
EJECTION FRACTION APICAL 4 CHAMBER: 62.7
LEFT ATRIUM VOLUME AREA LENGTH INDEX BSA: 26.8 ML/M2
LEFT VENTRICLE INTERNAL DIMENSION DIASTOLE: 4.68 CM (ref 3.5–6)
LEFT VENTRICULAR OUTFLOW TRACT DIAMETER: 2.3 CM
LV EJECTION FRACTION BIPLANE: 60 %
MITRAL VALVE E/A RATIO: 0.69
MITRAL VALVE E/E' RATIO: 7.6
RIGHT VENTRICLE FREE WALL PEAK S': 14.6 CM/S
RIGHT VENTRICLE PEAK SYSTOLIC PRESSURE: 24.9 MMHG
TRICUSPID ANNULAR PLANE SYSTOLIC EXCURSION: 2.5 CM

## 2024-05-13 DIAGNOSIS — I10 HYPERTENSION, UNSPECIFIED TYPE: ICD-10-CM

## 2024-05-13 DIAGNOSIS — G62.9 NEUROPATHY: ICD-10-CM

## 2024-05-13 DIAGNOSIS — R05.9 COUGH, UNSPECIFIED TYPE: ICD-10-CM

## 2024-05-14 RX ORDER — GABAPENTIN 300 MG/1
300 CAPSULE ORAL NIGHTLY
Qty: 90 CAPSULE | Refills: 0 | Status: SHIPPED | OUTPATIENT
Start: 2024-05-14

## 2024-05-14 RX ORDER — AMLODIPINE BESYLATE 2.5 MG/1
2.5 TABLET ORAL DAILY
Qty: 90 TABLET | Refills: 0 | Status: SHIPPED | OUTPATIENT
Start: 2024-05-14 | End: 2024-08-12

## 2024-05-28 ENCOUNTER — LAB (OUTPATIENT)
Dept: LAB | Facility: LAB | Age: 79
End: 2024-05-28
Payer: MEDICARE

## 2024-06-17 DIAGNOSIS — E78.5 DYSLIPIDEMIA: ICD-10-CM

## 2024-06-17 DIAGNOSIS — N40.0 BENIGN PROSTATIC HYPERPLASIA WITHOUT LOWER URINARY TRACT SYMPTOMS: ICD-10-CM

## 2024-06-17 DIAGNOSIS — R05.9 COUGH, UNSPECIFIED TYPE: ICD-10-CM

## 2024-06-17 DIAGNOSIS — I10 HYPERTENSION, UNSPECIFIED TYPE: ICD-10-CM

## 2024-06-17 RX ORDER — FINASTERIDE 5 MG/1
TABLET, FILM COATED ORAL
Qty: 90 TABLET | Refills: 0 | Status: SHIPPED | OUTPATIENT
Start: 2024-06-17

## 2024-06-17 RX ORDER — SIMVASTATIN 10 MG/1
10 TABLET, FILM COATED ORAL NIGHTLY
Qty: 90 TABLET | Refills: 0 | Status: SHIPPED | OUTPATIENT
Start: 2024-06-17

## 2024-06-17 RX ORDER — AMLODIPINE BESYLATE 2.5 MG/1
2.5 TABLET ORAL DAILY
Qty: 90 TABLET | Refills: 0 | Status: SHIPPED | OUTPATIENT
Start: 2024-06-17 | End: 2024-06-20 | Stop reason: SDUPTHER

## 2024-06-20 DIAGNOSIS — I10 HYPERTENSION, UNSPECIFIED TYPE: ICD-10-CM

## 2024-06-20 DIAGNOSIS — R05.9 COUGH, UNSPECIFIED TYPE: ICD-10-CM

## 2024-06-20 RX ORDER — AMLODIPINE BESYLATE 2.5 MG/1
2.5 TABLET ORAL DAILY
Qty: 90 TABLET | Refills: 0 | Status: SHIPPED | OUTPATIENT
Start: 2024-06-20 | End: 2024-06-21 | Stop reason: SDUPTHER

## 2024-06-21 DIAGNOSIS — R05.9 COUGH, UNSPECIFIED TYPE: ICD-10-CM

## 2024-06-21 DIAGNOSIS — I10 HYPERTENSION, UNSPECIFIED TYPE: ICD-10-CM

## 2024-06-21 RX ORDER — AMLODIPINE BESYLATE 2.5 MG/1
2.5 TABLET ORAL DAILY
Qty: 90 TABLET | Refills: 1 | Status: SHIPPED | OUTPATIENT
Start: 2024-06-21 | End: 2024-12-18

## 2024-08-12 DIAGNOSIS — I10 HYPERTENSION, UNSPECIFIED TYPE: ICD-10-CM

## 2024-08-12 DIAGNOSIS — R05.9 COUGH, UNSPECIFIED TYPE: ICD-10-CM

## 2024-08-13 RX ORDER — AMLODIPINE BESYLATE 2.5 MG/1
2.5 TABLET ORAL DAILY
Qty: 90 TABLET | Refills: 1 | Status: SHIPPED | OUTPATIENT
Start: 2024-08-13 | End: 2025-02-09

## 2024-08-22 DIAGNOSIS — E78.5 DYSLIPIDEMIA: ICD-10-CM

## 2024-08-22 DIAGNOSIS — N40.0 BENIGN PROSTATIC HYPERPLASIA WITHOUT LOWER URINARY TRACT SYMPTOMS: ICD-10-CM

## 2024-08-22 RX ORDER — SIMVASTATIN 10 MG/1
10 TABLET, FILM COATED ORAL NIGHTLY
Qty: 90 TABLET | Refills: 0 | Status: SHIPPED | OUTPATIENT
Start: 2024-08-22

## 2024-08-22 RX ORDER — FINASTERIDE 5 MG/1
TABLET, FILM COATED ORAL
Qty: 90 TABLET | Refills: 0 | Status: SHIPPED | OUTPATIENT
Start: 2024-08-22

## 2024-09-27 DIAGNOSIS — G62.9 NEUROPATHY: ICD-10-CM

## 2024-09-27 RX ORDER — GABAPENTIN 300 MG/1
300 CAPSULE ORAL NIGHTLY
Qty: 90 CAPSULE | Refills: 0 | Status: SHIPPED | OUTPATIENT
Start: 2024-09-27

## 2024-10-04 DIAGNOSIS — R42 DIZZINESS: ICD-10-CM

## 2024-10-04 RX ORDER — MECLIZINE HCL 12.5 MG 12.5 MG/1
12.5 TABLET ORAL 3 TIMES DAILY PRN
Qty: 30 TABLET | Refills: 0 | Status: SHIPPED | OUTPATIENT
Start: 2024-10-04

## 2024-10-30 DIAGNOSIS — E78.5 DYSLIPIDEMIA: ICD-10-CM

## 2024-10-30 DIAGNOSIS — N40.0 BENIGN PROSTATIC HYPERPLASIA WITHOUT LOWER URINARY TRACT SYMPTOMS: ICD-10-CM

## 2024-10-30 RX ORDER — SIMVASTATIN 10 MG/1
10 TABLET, FILM COATED ORAL NIGHTLY
Qty: 90 TABLET | Refills: 0 | Status: SHIPPED | OUTPATIENT
Start: 2024-10-30

## 2024-10-30 RX ORDER — FINASTERIDE 5 MG/1
TABLET, FILM COATED ORAL
Qty: 90 TABLET | Refills: 0 | Status: SHIPPED | OUTPATIENT
Start: 2024-10-30

## 2024-10-31 DIAGNOSIS — R05.9 COUGH, UNSPECIFIED TYPE: ICD-10-CM

## 2024-10-31 DIAGNOSIS — I10 HYPERTENSION, UNSPECIFIED TYPE: ICD-10-CM

## 2024-11-01 RX ORDER — AMLODIPINE BESYLATE 2.5 MG/1
2.5 TABLET ORAL DAILY
Qty: 90 TABLET | Refills: 1 | Status: SHIPPED | OUTPATIENT
Start: 2024-11-01 | End: 2025-04-30

## 2024-11-22 ENCOUNTER — HOSPITAL ENCOUNTER (OUTPATIENT)
Dept: RADIOLOGY | Facility: HOSPITAL | Age: 79
Discharge: HOME | End: 2024-11-22
Payer: MEDICARE

## 2024-11-22 DIAGNOSIS — F03.90 UNSPECIFIED DEMENTIA, UNSPECIFIED SEVERITY, WITHOUT BEHAVIORAL DISTURBANCE, PSYCHOTIC DISTURBANCE, MOOD DISTURBANCE, AND ANXIETY: ICD-10-CM

## 2024-11-22 PROCEDURE — 78814 PET IMAGE W/CT LMTD: CPT

## 2024-11-22 PROCEDURE — A9586 FLORBETAPIR F18: HCPCS

## 2024-11-22 PROCEDURE — 3430000001 HC RX 343 DIAGNOSTIC RADIOPHARMACEUTICALS

## 2024-12-23 DIAGNOSIS — G62.9 NEUROPATHY: ICD-10-CM

## 2024-12-23 RX ORDER — GABAPENTIN 300 MG/1
300 CAPSULE ORAL NIGHTLY
Qty: 90 CAPSULE | Refills: 0 | Status: SHIPPED | OUTPATIENT
Start: 2024-12-23

## 2024-12-31 DIAGNOSIS — E78.5 DYSLIPIDEMIA: ICD-10-CM

## 2024-12-31 DIAGNOSIS — N40.0 BENIGN PROSTATIC HYPERPLASIA WITHOUT LOWER URINARY TRACT SYMPTOMS: ICD-10-CM

## 2025-01-02 RX ORDER — FINASTERIDE 5 MG/1
TABLET, FILM COATED ORAL
Qty: 90 TABLET | Refills: 0 | Status: SHIPPED | OUTPATIENT
Start: 2025-01-02

## 2025-01-02 RX ORDER — SIMVASTATIN 10 MG/1
10 TABLET, FILM COATED ORAL NIGHTLY
Qty: 90 TABLET | Refills: 0 | Status: SHIPPED | OUTPATIENT
Start: 2025-01-02

## 2025-01-03 DIAGNOSIS — R05.9 COUGH, UNSPECIFIED TYPE: ICD-10-CM

## 2025-01-03 DIAGNOSIS — I10 HYPERTENSION, UNSPECIFIED TYPE: ICD-10-CM

## 2025-01-03 RX ORDER — AMLODIPINE BESYLATE 2.5 MG/1
2.5 TABLET ORAL DAILY
Qty: 100 TABLET | Refills: 0 | Status: SHIPPED | OUTPATIENT
Start: 2025-01-03

## 2025-01-23 ENCOUNTER — TELEPHONE (OUTPATIENT)
Dept: NEUROLOGY | Facility: CLINIC | Age: 80
End: 2025-01-23
Payer: MEDICARE

## 2025-01-23 NOTE — TELEPHONE ENCOUNTER
Received a new pt referral from Dr. Villa [facility].  All work up complete.  Awaiting APO-E status, drawn today.  Wife to bring a copy of recent EKG.  We discussed the ATT medications including mechanism of action, frequency, side effects, MRI schedule, pre-medications, location of infusion center, insurance coverage.  I answered all of their questions.  Pt is scheduled for 3/10/25 with Dr. Ramirez.  I will email them the appt details.  They have my contact information should further questions come up.

## 2025-01-30 DIAGNOSIS — N40.0 BENIGN PROSTATIC HYPERPLASIA WITHOUT LOWER URINARY TRACT SYMPTOMS: ICD-10-CM

## 2025-01-30 DIAGNOSIS — R05.9 COUGH, UNSPECIFIED TYPE: ICD-10-CM

## 2025-01-30 DIAGNOSIS — E78.5 DYSLIPIDEMIA: ICD-10-CM

## 2025-01-30 DIAGNOSIS — I10 HYPERTENSION, UNSPECIFIED TYPE: ICD-10-CM

## 2025-01-30 RX ORDER — FINASTERIDE 5 MG/1
TABLET, FILM COATED ORAL
Qty: 90 TABLET | Refills: 0 | Status: SHIPPED | OUTPATIENT
Start: 2025-01-30

## 2025-01-30 RX ORDER — AMLODIPINE BESYLATE 2.5 MG/1
2.5 TABLET ORAL DAILY
Qty: 100 TABLET | Refills: 0 | Status: SHIPPED | OUTPATIENT
Start: 2025-01-30

## 2025-01-30 RX ORDER — SIMVASTATIN 10 MG/1
10 TABLET, FILM COATED ORAL NIGHTLY
Qty: 90 TABLET | Refills: 0 | Status: SHIPPED | OUTPATIENT
Start: 2025-01-30

## 2025-03-10 ENCOUNTER — OFFICE VISIT (OUTPATIENT)
Dept: BEHAVIORAL HEALTH | Facility: CLINIC | Age: 80
End: 2025-03-10
Payer: MEDICARE

## 2025-03-10 ENCOUNTER — SOCIAL WORK (OUTPATIENT)
Dept: GERIATRIC MEDICINE | Facility: CLINIC | Age: 80
End: 2025-03-10
Payer: MEDICARE

## 2025-03-10 ENCOUNTER — APPOINTMENT (OUTPATIENT)
Dept: GERIATRIC MEDICINE | Facility: CLINIC | Age: 80
End: 2025-03-10
Payer: MEDICARE

## 2025-03-10 ENCOUNTER — APPOINTMENT (OUTPATIENT)
Dept: BEHAVIORAL HEALTH | Facility: CLINIC | Age: 80
End: 2025-03-10
Payer: MEDICARE

## 2025-03-10 VITALS
SYSTOLIC BLOOD PRESSURE: 129 MMHG | HEART RATE: 77 BPM | DIASTOLIC BLOOD PRESSURE: 87 MMHG | BODY MASS INDEX: 20.08 KG/M2 | TEMPERATURE: 98.1 F | WEIGHT: 144 LBS

## 2025-03-10 DIAGNOSIS — G31.84 MILD COGNITIVE IMPAIRMENT: ICD-10-CM

## 2025-03-10 DIAGNOSIS — G30.9 ALZHEIMER'S DISEASE: ICD-10-CM

## 2025-03-10 DIAGNOSIS — G31.01 PRIMARY PROGRESSIVE APHASIA (MULTI): ICD-10-CM

## 2025-03-10 DIAGNOSIS — F02.80 ALZHEIMER'S DISEASE: ICD-10-CM

## 2025-03-10 DIAGNOSIS — F02.80 PRIMARY PROGRESSIVE APHASIA (MULTI): ICD-10-CM

## 2025-03-10 PROCEDURE — 1036F TOBACCO NON-USER: CPT | Performed by: PSYCHIATRY & NEUROLOGY

## 2025-03-10 PROCEDURE — 3074F SYST BP LT 130 MM HG: CPT | Performed by: PSYCHIATRY & NEUROLOGY

## 2025-03-10 PROCEDURE — 99215 OFFICE O/P EST HI 40 MIN: CPT | Mod: AM | Performed by: PSYCHIATRY & NEUROLOGY

## 2025-03-10 PROCEDURE — 1159F MED LIST DOCD IN RCRD: CPT | Performed by: PSYCHIATRY & NEUROLOGY

## 2025-03-10 PROCEDURE — 1170F FXNL STATUS ASSESSED: CPT | Performed by: PSYCHIATRY & NEUROLOGY

## 2025-03-10 PROCEDURE — 3079F DIAST BP 80-89 MM HG: CPT | Performed by: PSYCHIATRY & NEUROLOGY

## 2025-03-10 PROCEDURE — 1126F AMNT PAIN NOTED NONE PRSNT: CPT | Performed by: PSYCHIATRY & NEUROLOGY

## 2025-03-10 PROCEDURE — 99205 OFFICE O/P NEW HI 60 MIN: CPT | Performed by: PSYCHIATRY & NEUROLOGY

## 2025-03-10 RX ORDER — DONEPEZIL HYDROCHLORIDE 10 MG/1
10 TABLET, FILM COATED ORAL NIGHTLY
COMMUNITY

## 2025-03-10 ASSESSMENT — ACTIVITIES OF DAILY LIVING (ADL)
GROOMING: INDEPENDENT
MANAGING_FINANCES: NEEDS ASSISTANCE
BATHING: INDEPENDENT
PILL_BOX_USED: YES
NEEDS_ASSISTANCE_WITH_FOOD: INDEPENDENT
HEARING - RIGHT EAR: HEARING AID
HEARING - LEFT EAR: HEARING AID
PATIENT'S MEMORY ADEQUATE TO SAFELY COMPLETE DAILY ACTIVITIES?: YES
GROCERY_SHOPPING: INDEPENDENT
DOING_HOUSEWORK: INDEPENDENT
USING_TRANSPORTATION: INDEPENDENT
FEEDING YOURSELF: INDEPENDENT
STILL_DRIVING: YES
JUDGMENT_ADEQUATE_SAFELY_COMPLETE_DAILY_ACTIVITIES: YES
USING_TELEPHONE: INDEPENDENT
ADEQUATE_TO_COMPLETE_ADL: YES
DRESSING YOURSELF: INDEPENDENT
TOILETING: INDEPENDENT
WALKS IN HOME: INDEPENDENT
EATING: INDEPENDENT
PREPARING_MEALS: INDEPENDENT
ASSISTIVE_DEVICE: EYEGLASSES
TAKING_MEDICATION: INDEPENDENT

## 2025-03-10 ASSESSMENT — GERIATRIC DEPRESSION SCALE SHORT VERSION (GDS-SV)
ARE YOU AFRAID THAT SOMETHING BAD IS GOING TO HAPPEN TO YOU: NO
DO YOU OFTEN GET BORED: NO
DO YOU FEEL FULL OF ENERGY: YES
DO YOU THINK THAT MOST PEOPLE ARE BETTER OFF THAN YOU ARE: NO
DO YOU FEEL PRETTY WORTHLESS THE WAY YOU ARE NOW: NO
DO YOU FEEL YOU HAVE MORE PROBLEMS WITH MEMORY THAN MOST: NO
DO YOU PREFER TO STAY AT HOME, RATHER THAN GOING OUT AND DOING NEW THINGS: NO
DO YOU THINK IT IS WONDERFUL TO BE ALIVE NOW: YES
GDS TOTAL SCORE: 2
DO YOU FEEL HAPPY MOST OF THE TIME: YES
HAVE YOU DROPPED MANY OF YOUR ACTIVITIES AND INTERESTS?: NO
ARE YOU IN GOOD SPIRITS MOST OF THE TIME: NO
DO YOU OFTEN FEEL HELPLESS: YES
DO YOU FEEL THAT YOUR SITUATION IS HOPELESS: NO
DO YOU FEEL THAT YOUR LIFE IS EMPTY: NO
ARE YOU BASICALLY SATISFIED WITH YOUR LIFE: YES

## 2025-03-10 ASSESSMENT — MINI MENTAL STATE EXAM
WHAT STATE, COUNTRY, CITY, HOSPITAL, FLOOR: 4 CORRECT
SUM ALL MMSE QUESTIONS FOR TOTAL SCORE [OUT OF 30].: 15
NAME OR REPEAT 3 OBJECTS - (APPLE, TABLE, PENNY) OR (BALL, TREE, FLAG): 1 CORRECT
SPELL THE WORD WORLD FORWARD AND BACKWARDS OR SERIAL 7S: 1 CORRECT
PLACE DESIGN, ERASER AND PENCIL IN FRONT OF THE PERSON.  SAY:  COPY THIS DESIGN PLEASE.  SHOW: DESIGN. ALLOW: MULTIPLE TRIES. WAIT UNTIL PERSON IS FINISHED AND HANDS IT BACK. SCORE: ONLY FOR DIAGRAM WITH 4-SIDED FIGURE BETWEEN TWO 5-SIDED FIGURES: 1 CORRECT
SAY:  READ THE WORDS ON THE PAGE AND THEN DO WHAT IT SAYS.  THEN HAND THE PERSON THE SHEET WITH CLOSE YOUR EYES ON IT.  IF THE SUBJECT READS AND DOES NOT CLOSE THEIR EYES, REPEAT UP TO THREE TIMES.  SCORE ONLY IF SUBJECT CLOSES EYES.: 2 CORRECT
HAND THE PERSON A PENCIL AND PAPER. SAY:  WRITE ANY COMPLETE SENTENCE ON THAT PIECE OF PAPER. (NOTE: THE SENTENCE MUST MAKE SENSE.  IGNORE SPELLING ERRORS): 0 CORRECT
SAY: I WOULD LIKE YOU TO REPEAT THIS PHRASE AFTER ME: NO IFS, ANDS, OR BUTS.: 1 CORRECT
WHAT IS THE YEAR, SEASON, DATE, DAY, AND MONTH: 4 CORRECT
PLEASE COPY THIS PICTURE (NOTE ALL 10 ANGLES MUST BE PRESENT AND TWO MUST INTERSECT): 0 CORRECT
RECALL THE 3 OBJECTS FROM ABOVE (APPLE, TABLE, PENNY) OR (BALL, TREE, FLAG): 0 CORRECT / UNABLE TO SCORE
SHOW: PENCIL [OBJECT] ASK: WHAT IS THIS CALLED?: 1 CORRECT

## 2025-03-10 ASSESSMENT — ENCOUNTER SYMPTOMS: OCCASIONAL FEELINGS OF UNSTEADINESS: 1

## 2025-03-10 ASSESSMENT — PAIN SCALES - GENERAL: PAINLEVEL_OUTOF10: 0-NO PAIN

## 2025-03-10 NOTE — PROGRESS NOTES
Patient ID: Luis Bain is a 79 y.o. male who presents for cognitive impairment.    Identifying Information                              : 1945             Assessment date: 3/10/2025    Patient accompanied by:  Has Alzheimer's Dementia for 1.5 year ago. Diagnosed 2024.            History provided by: Wife Alyse, and daughter Meli and son Miguel A    Symptoms Reported (include length of time): Word finding problems and aphasia. Trouble getting out what he wants to say.     CONCERNS IDENTIFIED BY NURSING (Safety Risks, Health Issues, etc)     1. Did not know to dial 911.     Daily Functioning Assessment    ADL Screening  Patient's Vision Adequate to Safely Complete Daily Activities: Yes  Patient's Judgment Adequate to Safely Complete Daily Activities: Yes  Patient's Memory Adequate to Safely Complete Daily Activities: Yes  Patient Able to Express Needs/Desires: Yes  Which is your dominant hand?: Right  Dressing: Independent  Grooming: Independent  Feeding: Independent  Bathing: Independent  Toileting: Independent  In/Out Bed: Independent  Walks in Home: Independent  Weakness of Legs: Both (has lower back pain, does stretching, has neuropathy in both feet)  Weakness of Arms/Hands: None  Hearing - Right Ear: Hearing aid  Hearing - Left Ear: Hearing aid     IADL's  Using Telephone: Independent  Grocery Shopping: Independent  Preparing Meals: Independent  Doing Housework: Independent  Laundry: Independent  Taking Medication: Independent  Pill Box Used: Yes  Managing Finances: Needs assistance  Using Transportation: Independent  Still Driving: Yes  Eating: Independent  Needs Assistance With Food: Independent  Difficulty Chewing or Swallowing: No     Nutrition and Exercise  Current Diet: Well Balanced Diet (low carb low sugar diet)  Adequate Fluid Intake: Yes (water)  Caffeine: Yes (2 cups of coffee a day)  Appetite:: Good  Food Consistency:: Regular  Liquids Consistency:: Thin  Changes in Weight?:  Yes  Chewing or Swallowing Problems?: No  Exercise Frequency: Regularly (walks 1.5 hours often uses a treadmill, back stretching exercises 45 minutes)    Safety Concerns  Safety Concerns: None        Sleeping pattern: Goes to bed at 10-11pm, wakes up at 7am. Gets up once to use the bathroom. Energy is normal during the day.

## 2025-03-10 NOTE — PATIENT INSTRUCTIONS
Plan:   - We discussed the two amyloid targeting medications (Kisunla and Leqembi) today. We will discuss this in a consensus meeting to determine whether you would be a good candidate.   - We will request images from MRI done at [facility] in January 2025.   - Continue donepezil 10mg once daily.   - Follow up visit to be scheduled.   - Call [phone] to contact anti-amyloid therapy nurse coordinatior Pham Jc RN in case of any questions or concerns regarding treatment.      Brain-healthy lifestyle:   - Make sure your medical conditions (if any) such as diabetes, high blood pressure, high cholesterol, thyroid disease sleep apnea are optimally controlled.   - Use eyeglasses or hearing aids appropriately if needed.   - Eat a heart healthy diet (like a Mediterranean diet; lots of fruits and vegetables, fish; low fat;)  - Exercise regularly as tolerated.   - Maintain good sleep hygiene; avoid daytime naps; try to get 7 to 8 hours of continuous sleep at night.   - Stay mentally active - puzzles, word searches, books, playing cards.  - Stay socially active and engaged.

## 2025-03-10 NOTE — PROGRESS NOTES
[place], [place]      [facility] Initial Consultation:     Luis Bain  is 79 y.o. -year-old with a history of mild cognitive impairment, HTN, hyperlipidemia, heart murmur. He is being evaluated for eligibility for anti-amyloid therapy.   Referred by Dr. Villa .   Seen with wife Alyse, daughter Meli, son Miguel A.     HPI:   He has had cognitive difficulties for about a year or so. He has trouble getting word words out.   He does not seem to have problems with recalling information. Does not repeat himself.   Manages things at home fairly well.     he does not have change in personality, social disinhibition, loss of empathy, change in dietary preferences, stereotyped or repetitive behaviors.     he does not have visual hallucinations, fluctuating cognition, tremors, REM sleep behavior disorder, falls.     he does not have urinary incontinence.     Functional changes:   Current living situation - Lives at home; lives with spouse  Driving - Denies problems driving.   Finances - he has backed off from managing finances.   Cooking - denies problems. Has not had kitchen mishaps   ADLS - independent with ADLs.   Medications - Takes own medication. Has a pillbox.   Weapons at home: no    Neuropsychiatric symptoms:   Depression - denies depression. Gets frustrated when he cannot find words. Denies any death wishes or SI/   Anxiety - denied  Psychosis - none   Gay - none  Suicidality - denied.     Psychiatric Review Of Systems:     As above     Medical Review Of Systems:    Pertinent items are noted in HPI.      Prior Work-up:   TSH - 2.63 (1/9/25)   Vit B12 - 709 (4/30/24)  Folate - 17.9 (5/5/23)  Neuropsychological testing -   Brain Imaging - MRI brain 1/20/2025 - per report:   *  No evidence of an acute intracranial process or intracranial mass.   *  Mild to moderate generalized volume loss.   *  Hippocampal volumes at the 51st percentile when compared to age   matched  normal controls by quantitative analysis.   *  Mild white matter disease which is nonspecific but likely reflective   of chronic microvascular ischemia.   *  No evidence of parenchymal microhemorrhages by MRI.     Amyloid status: - Amyloid PET (11/22/24) - positive (101.3 Centiloids)    Past Neuropsychiatric History:  Handedness: right  History of traumatic brain injury: No  History of seizures: No  History of stroke: No    Past psychiatric history:   No prior psychiatric treatment or hospitalizations.   No history of suicide attempts.     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      amLODIPine (Norvasc) 2.5 mg tablet TAKE 1 TABLET BY MOUTH ONCE  DAILY 100 tablet 0    b complex vitamins capsule Take 1 capsule by mouth.      blood pressure test kit-large kit 1 each 3 times a week. 1 each 0    coenzyme Q-10 100 mg capsule Take 1 capsule (100 mg) by mouth 2 times a day.      finasteride (Proscar) 5 mg tablet TAKE 1 TABLET BY MOUTH DAILY (DO NOT CRUSH, CHEW, OR SPLIT) 90 tablet 0    gabapentin (Neurontin) 300 mg capsule Take 1 capsule (300 mg) by mouth once daily at bedtime. 90 capsule 0    L. acidophilus/Bifid. animalis 32 billion cell capsule Take 1 capsule by mouth once daily.      lansoprazole (Prevacid) 30 mg DR capsule Take 1 capsule (30 mg) by mouth once daily.      magnesium citrate 100 mg capsule Take 100 mg by mouth early in the morning..      meclizine (Antivert) 12.5 mg tablet Take 1 tablet (12.5 mg) by mouth 3 times a day as needed for nausea. 30 tablet 0    omeprazole (PriLOSEC) 20 mg DR capsule Take  "1 capsule (20 mg) by mouth.      ondansetron ODT (Zofran-ODT) 4 mg disintegrating tablet Take 1 tablet (4 mg) by mouth every 8 hours if needed.      sildenafil (Viagra) 100 mg tablet Take 1 tablet (100 mg) by mouth if needed.      simvastatin (Zocor) 10 mg tablet TAKE 1 TABLET BY MOUTH ONCE  DAILY AT BEDTIME 90 tablet 0     No current facility-administered medications on file prior to visit.        Allergies:   Allergies   Allergen Reactions    Succinylcholine Unknown and Other     Pt states age 41s had surgery took over 3 hours to wake, heard people talking, could not move or respond. Had no further testing or eval, had no problem waking since that incident.    Succinylcholine Chloride Other and Unknown     Delayed Awakening    Pt states age 41s had surgery took over 3 hours to wake, heard people talking, could not move or respond. Had no further testing or eval, had no problem waking since that incident.       Family history:   Psychiatric: one brother committed suicide.   Dementia: father was very forgetful but never got evaluated;  from heart attack.   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     Occupational: worked as a printer. Retired.   Tobacco use: none   Alcohol: occasional wine drinker  Recreational drugs: none    Mental Status Examination:  Appearance: Appears to be stated age. Well groomed. Good hygiene.   Behavior/Attitude: Cooperative. Pleasant.    Motor: Psychomotor activity in average range. No abnormal involuntary movements.    Speech: Regular in rate, tone and volume. No pressure.   Mood: \"okay\"  Affect: Congruent to stated mood. Mobilized appropriately. Normal range.    Thought process: Goal-directed. Linear. Organized.   Thought content: No paranoia, delusion or ideas of reference elicited. No hallucinations in auditory, visual or other sensory modalities.    Suicidal ideation: denied. " "  Homicidal ideation: none reported.   Insight: Fair  Judgment: Fair  Recent and remote memory: fair recall of recent and remote information.   Attention/concentration: intact during visit   Language: Had word-finding difficulties during conversation. No paraphasic errors.   Fund of knowledge: Average       MMSE:   15/30     GDS: 0/15  Geriatric Depression Scale  Are you basically satisfied with your life?: Yes  Have you dropped many of your activities and interests?: No  Do you feel that your life is empty?: No  Do you often get bored?: No  Are you in good spirits most of the time?: No  Are you afraid that something bad is going to happen to you?: No  Do you feel happy most of the time?: Yes  Do you often feel helpless?: Yes  Do you prefer to stay at home, rather than going out and doing new things?: No  Do you feel you have more problems with memory than most?: No  Do you think it is wonderful to be alive now?: Yes  Do you feel pretty worthless the way you are now?: No  Do you feel full of energy?: Yes  Do you feel that your situation is hopeless?: No  Do you think that most people are better off than you are?: No    FAQ: 4    Fluency: \"f\"= [7]/min, \"animals\"    NEUROLOGICAL EXAMINATION  Cranial nerves:  Cranial nerve II: Visual fields full to confrontation.   Cranial nerves III, IV, and VI: Pupils round, equally reactive to light; no ptosis. EOMs intact. No nystagmus.   Cranial Nerve V: Facial sensation intact bilaterally.   Cranial nerve VII: Normal and symmetric facial strength.   Cranial nerve VIII: Hearing is intact bilaterally to tuning fork.   Cranial nerves IX and X: Palate elevates symmetrically.   Cranial nerve XI: Shoulder shrug and neck rotation strength are intact.   Cranial nerve XII: Tongue midline with normal strength.    Motor:   Normal muscle tone, bulk, and strength. No asymmetries noted.  Finger taps - normal  Hand opening/closing - normal  Tone - normal  Abnormal movements - No    Reflexes: "   Right UE           LEFT UE  BR: normal               BR: normal  Biceps: normal  Biceps: normal  Triceps: normal   Triceps: normal    RIGHT LE    LEFT LE  Knee: normal   Knee: normal  Ankle: normal  Ankle: normal    Jos's reflex - negative    Coordination:  Finger-nose-finger testing is normal and without tremor or dysmetria.    Gait: no ataxia or bradykinesia. No shuffling. Normal arm swing.     Able to stand from seated position with arms across chest: Yes    Sensory:  Normal to light touch and vibration in all extremities    Romberg's sign: Normal      Assessment/Plan   Assessment:   Luis Bain  is 79 y.o. -year-old with a history of mild cognitive impairment, HTN, hyperlipidemia, heart murmur. He is being evaluated for eligibility for anti-amyloid therapy.     He has a history of insidious onset and gradual progression of word-finding difficulties. His memory is not significantly impaired. There is minimal functional impairment related to managing finances but otherwise is independent in all ADLs and iADLs.     TSH - 2.63 (1/9/25)   Vit B12 - 709 (4/30/24)  Folate - 17.9 (5/5/23)  Neuropsychological testing -   Brain Imaging - MRI brain 1/20/2025 - per report:   *  No evidence of an acute intracranial process or intracranial mass.   *  Mild to moderate generalized volume loss.   *  Hippocampal volumes at the 51st percentile when compared to age   matched normal controls by quantitative analysis.   *  Mild white matter disease which is nonspecific but likely reflective   of chronic microvascular ischemia.   *  No evidence of parenchymal microhemorrhages by MRI.     Amyloid status: - Amyloid PET (11/22/24) - positive (101.3 Centiloids)    MMSE:  3/10/25 - 15/30    Diagnosis:   Mild cognitive impairment vs early dementia.   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   Etiological diagnosis is Alzheimer's disease.     Treatment Plan/Recommendations:   - They are interested in amyloid targeting  antibodies. They expressed interest in Kisunla due to lower frequency of infusions and option to discontinue treatment after amyloid clearance.   - I discussed anti-amyloid therapies in detail. Specifically, I discussed the goals of treatment, which includes intervening at an early stage of the disease by clearing brain amyloid plaques which can slow down disease-related cognitive decline. Although his MMSE score is lower, it is likely overestimated due to his predominant language deficits, and his disease stage is likely early. We can consider infusion with one of the therapies but would like to discuss in a consensus meeting first.   - I also discussed the limitations of treatment including the fact that this treatment does not cure the disease as the disease process may involve more than amyloid plaques.   - I also have also discussed potential risks including infusion-related reactions, and brain swelling and brain bleeding (Amyloid Related Imaging Abnormalities, or ARIA). I have discussed that genetic factors (ApoE status) influence the frequency of ARIA. 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   - I also discussed the high burden imposed by requirement for frequent visits for infusion as well as safety MRIs.   - I also discussed alternative treatments including lifestyle modification, environmental support, as well as using existing FDA approved oral medications for Alzheimer's disease like Donepezil, Rivastigmine or Galantamine, which can be used concurrently with anti-amyloid therapies. He is already taking donepezil 10mg daily.   - I will request MRI images to be uploaded.   - Continue donepezil 10mg daily.   - Follow up to be arranged based on decision regarding anti-amyloid therapy.     Natan Ramirez MD.

## 2025-03-10 NOTE — PROGRESS NOTES
"Patient ID: Luis Bain is a 79 y.o. male who presents for cognitive evaluation for potential treatment options.    Identifying Information                              : 1945  Assessment date: 3/10/2025    Patient accompanied by:  wife, Tiffany, daughter, Meli, and son, Miguel A     History provided by: patient and family    Symptoms Reported (include length of time): Patient dx with Alzheimer's disease in 2024, cognitive decline presentation for 1.5 years.     CONCERNS IDENTIFIED BY SOCIAL WORK (Safety Risks, Health Issues, Advanced Directives not completed, etc)     1. Patient is driving     2. Patient was unable to recall 911.                      Social History     Socioeconomic History    Marital status:      Spouse name: Tiffany    Number of children: 2    Years of education: 13    Highest education level: High school graduate   Occupational History    Occupation: [employer]     Comment:    Tobacco Use    Smoking status: Never    Smokeless tobacco: Never   Vaping Use    Vaping status: Never Used   Substance and Sexual Activity    Alcohol use: Not Currently     Alcohol/week: 2.0 standard drinks of alcohol     Types: 2 Glasses of wine per week     Comment: one glass of wine a couple nights a week    Drug use: Never    Sexual activity: Yes        ENCOUNTER SCREENING RESULTS     MMSE (Mini Mental State Exam)  What is the Year, Season, Date, Day, and Month?: 4 correct  Where are we: State, Country, City, Hospital, Floor?: 4 correct  Name / Repeat 3 objects:( Apple, Table, Emily) or (Ball, Tree, Flag) : 1 correct  Serial 7's backwards or spell World backwards?: 1 correct  Recall the 3 objects from above (apple, table, emily) or (ball, tree, flag): 0 correct / Unable to score  Name the following: pencil, watch: 1 correct  Repeat the following: \"No ifs, ands, or buts.\": 1 correct  Follow these commands: Take a paper in your right hand, fold it in half, and put it on the floor.: 2 " "correct  \"Please read this and do what it says\" (Written instruction is \"Close your eyes.\"): 0 correct  Written instruction is \"Make up and write a sentence about anything.\" (This sentence must contain a noun and a verb.): 1 correct  Written instruction is: \"Please copy this picture.\" (All 10 angles must be present and two must intersect.): 0 correct  MMSE Total Score:: 15    Geriatric Depression Scale (Short Version) Total: 2    Advance Directives/Legal/Financial    Patient Healthcare POA:  Tiffany Bain, wife           Is Healthcare POA currently scanned into patient's chart: Copies of advanced directives were provided at this appointment and will be scanned into chart     DPOA for finances: Tiffany Bain, wife       Legal guardian:  NA     Living Will: yes     Any form of disability? no Type:     [place]? no       Significant financial stressors: none noted      Supportive Relationships (Informal Support)     Spouse/partner information: Tiffany     Children Information (include location, level of engagement):  Meli, daughter who lives in [place] and  Miguel A, son who lives in [place]     Other social supports: none noted     Living situation: house with wife      Formal Supports     Engaged community services (Emergency Alert, HHA, MOW, Case Management, Etc.): NA    Mental Health     Observed Mood: Normal     Observed Affect:calm and pleasant     Current mental health symptoms/diagnosis/treatment (include medications): NA     Relevant Loss/Grief: Patient shared his only sibling, his brother committed suicide recently.     Relevant mental health history: NA     Self-harm/suicidal thoughts, plan: None Reported     Alcohol, illicit drug, and tobacco use reported by patient/family: Patient reported he has one glass of wine with dinner a couple times a week.     Any addiction history: NA     Interests/Hobbies/Activities/Daily Routine: treadmill, computer, TV        Additional Notes or Follow-up Matters:  Patient was " engaged and pleasant throughout the evaluation. He struggled with coming up with his words during cognitive screening. He was aware of his communication challenges and presented with related frustration. He has a strong support system in his wife and adult children.  Plan: Review team evaluation results and share information/resources as indicated. Follow up regarding emergency planning and safety risks in regard to driving.

## 2025-03-12 ENCOUNTER — TELEPHONE (OUTPATIENT)
Dept: NEUROLOGY | Facility: CLINIC | Age: 80
End: 2025-03-12
Payer: MEDICARE

## 2025-03-20 ENCOUNTER — DOCUMENTATION (OUTPATIENT)
Dept: BEHAVIORAL HEALTH | Facility: CLINIC | Age: 80
End: 2025-03-20
Payer: MEDICARE

## 2025-03-20 NOTE — PROGRESS NOTES
Reviewed MRI brain (performed 1/20/25) at [facility] uploaded to  PACS:   Mild to moderate generalized volume loss, more prominent in left temporal and bilateral parietal regions.   Mild white matter disease.   No evidence of micro-hemorrhages (Nick BOLD sequence).   Quantitative analysis showed temporal lobe volumes in 1st percentile for entorhinal cortex, fusiform gyrus, and superior, middle and inferior temporal gyri on left, and 1st pecentile on entorhinal cortex, fusiform gyrus and inferior temporal gyrus on right. Hippocampi - 39 on left, 61 on right.   Left supramarginal gyrus 1st percentile on left; other wise other parietal regions in normal ranges.

## 2025-04-07 DIAGNOSIS — G30.9 ALZHEIMER'S DISEASE: ICD-10-CM

## 2025-04-07 DIAGNOSIS — F02.80 ALZHEIMER'S DISEASE: ICD-10-CM

## 2025-04-07 DIAGNOSIS — E85.89 ALZHEIMER'S TYPE AMYLOIDOSIS (MULTI): ICD-10-CM

## 2025-04-09 ENCOUNTER — SPECIALTY PHARMACY (OUTPATIENT)
Dept: NEUROLOGY | Facility: HOSPITAL | Age: 80
End: 2025-04-09
Payer: MEDICARE

## 2025-04-11 DIAGNOSIS — G31.84 MILD COGNITIVE IMPAIRMENT WITH MEMORY LOSS: Primary | ICD-10-CM

## 2025-04-11 PROBLEM — F02.A0: Status: ACTIVE | Noted: 2025-04-11

## 2025-04-11 PROBLEM — G30.9: Status: ACTIVE | Noted: 2025-04-11

## 2025-04-11 RX ORDER — ALBUTEROL SULFATE 0.83 MG/ML
3 SOLUTION RESPIRATORY (INHALATION) AS NEEDED
OUTPATIENT
Start: 2025-04-11

## 2025-04-11 RX ORDER — EPINEPHRINE 0.3 MG/.3ML
0.3 INJECTION SUBCUTANEOUS EVERY 5 MIN PRN
Status: CANCELLED | OUTPATIENT
Start: 2025-04-11

## 2025-04-11 RX ORDER — ALBUTEROL SULFATE 0.83 MG/ML
3 SOLUTION RESPIRATORY (INHALATION) AS NEEDED
OUTPATIENT
Start: 2025-07-04

## 2025-04-11 RX ORDER — ALBUTEROL SULFATE 0.83 MG/ML
3 SOLUTION RESPIRATORY (INHALATION) AS NEEDED
Status: CANCELLED | OUTPATIENT
Start: 2025-04-11

## 2025-04-11 RX ORDER — FAMOTIDINE 10 MG/ML
20 INJECTION, SOLUTION INTRAVENOUS ONCE AS NEEDED
OUTPATIENT
Start: 2025-07-04

## 2025-04-11 RX ORDER — DIPHENHYDRAMINE HYDROCHLORIDE 50 MG/ML
50 INJECTION, SOLUTION INTRAMUSCULAR; INTRAVENOUS AS NEEDED
OUTPATIENT
Start: 2025-04-11

## 2025-04-11 RX ORDER — DIPHENHYDRAMINE HYDROCHLORIDE 50 MG/ML
50 INJECTION, SOLUTION INTRAMUSCULAR; INTRAVENOUS AS NEEDED
Status: CANCELLED | OUTPATIENT
Start: 2025-04-11

## 2025-04-11 RX ORDER — DIPHENHYDRAMINE HYDROCHLORIDE 50 MG/ML
50 INJECTION, SOLUTION INTRAMUSCULAR; INTRAVENOUS AS NEEDED
OUTPATIENT
Start: 2025-07-04

## 2025-04-11 RX ORDER — EPINEPHRINE 0.3 MG/.3ML
0.3 INJECTION SUBCUTANEOUS EVERY 5 MIN PRN
OUTPATIENT
Start: 2025-04-11

## 2025-04-11 RX ORDER — EPINEPHRINE 0.3 MG/.3ML
0.3 INJECTION SUBCUTANEOUS EVERY 5 MIN PRN
OUTPATIENT
Start: 2025-07-04

## 2025-04-11 RX ORDER — FAMOTIDINE 10 MG/ML
20 INJECTION, SOLUTION INTRAVENOUS ONCE AS NEEDED
OUTPATIENT
Start: 2025-04-11

## 2025-04-11 RX ORDER — FAMOTIDINE 10 MG/ML
20 INJECTION, SOLUTION INTRAVENOUS ONCE AS NEEDED
Status: CANCELLED | OUTPATIENT
Start: 2025-04-11

## 2025-04-14 ENCOUNTER — TELEMEDICINE CLINICAL SUPPORT (OUTPATIENT)
Dept: NEUROLOGY | Facility: HOSPITAL | Age: 80
End: 2025-04-14
Payer: MEDICARE

## 2025-04-14 NOTE — PROGRESS NOTES
[facility] Clinical Note  Initial Patient Education   Healthcare Provider Administered Medication    Introduction  Luis Bain is a 79 y.o. male who is on the specialty pharmacy service for management of: Memory Care Non-Core.    Luis Bain is initiating the following therapy: Kisunla  700mg every 28 days for 3 doses followed by 1,400mg every 28 days     Initial Administration Date (planned or actual): pending insurance auth and AIC scheduling  Administration location: [facility]     Duration of therapy: Patient/Prescriber Specific- until sufficient amyloid clearance on PET    Patient has been referred to  Specialty Clinical Pharmacist medication management via: Therapy plan orders entered for  Ambulatory Infusion Center  The most recent encounter visit with the referring prescriber Dr. Ramirez on 3/10/25 was reviewed.  Pharmacy will continue to collaborate in the care of this patient with the referring prescriber.    Education/Discussion  Luis was contacted on 4/14/2025 at 9:49 AM for a pharmacy visit with encounter number [id number] from:   [place] [address] [facility] 5TH FLOOR  [facility] [address]  Dept: [phone]  Dept Fax: [phone]  Loc: [phone]  Luis and Spouse consented to a/an Telephone visit, which was performed.      Education discussed includes the following (medication specific):   Clinical Background:   Labs for clinical appropriateness that were reviewed include:     APOE genotype: non E4. E2/e3   Amyloid pathology confirmed? Yes, 101.3 centiloids   Date of last MRI Brain: 1/20/25 at [facility]   MoCA or MMSE score: MMSE 15 (Although his MMSE score is lower, it is likely overestimated due to his predominant language deficits, and his disease stage is likely early. -Dr. Ramirez)     Indication:   Kisunla is an anti-amyloid beta therapy being used for this  patient's Alzheimer disease (mild cognitive impairment or mild dementia).      Dose:    Infuse 700 mg every 4 weeks for 3 doses, then 1,400 mg every 4 weeks until amyloid plaques are reduced to minimal levels on amyloid PET imaging.      Administration details:    Your medication will be administered via intravenous infusion. The administration will take approximately 30 minutes, and your total appointment will last about 2 hours. You will receive further education onsite regarding the specifics of your appointment.       Post Administration Considerations:   - You will be closely monitored by clinic staff during and for 30 minutes after your infusion to ensure you don’t have any reactions to the medication. However, if you experience any serious reactions after leaving the clinic, please contact your physician's office immediately or call 911.   - Brain MRI should be done at baseline and prior to the 2nd, 3rd, 4th, and 7th infusions and as appropriate in the setting of amyloid-related imaging abnormalities.     Warnings, Precautions, and Adverse Reactions:    - Discussed common adverse effects, warnings and precautions pertinent to the medication including but not limited to: infusion-related reactions, headache, ARIA-H, and ARIA-E, signs and symptoms of hypersensitivity reaction.   - Patient was encouraged to reach out to their doctor’s office if they develop: infusion related reactions, or signs/symptoms of ARIA (abnormal gait, confusion, dizziness, focal neurologic deficits, headache, nausea, visual disturbance, seizures).     Reproductive Considerations (if applicable):   Speak to your neurologist if you become pregnant while taking this medication or are trying to become pregnant.     Efficacy timeline:    Efficacy should be monitored using amyloid PET imaging and Kisunla should be discontinued when amyloid plaques are reduced to minimal levels.          Timeline and Goals:  The follow up timeline was  discussed. Every person responds to and reacts to therapy differently. Patient should be assessed for efficacy and tolerability in approximately: 3 months    Provided education on goals and possible outcomes of therapy:  Adherence with therapy  Timely completion of appropriate labs  Timely and appropriate follow up with provider  Identify and address medication interactions with presciption medications, OTC medications and supplements  Optimize or maintain quality of life  Neurology- Neuromuscular/Memory Care: Prevent therapy gaps or delays due to medication access barriers  Slow progression of condition  Compliance with MRI monitoring and laboratory requirements ([facility])    Adherence:  Importance of adherence discussed with patient and they were advised to use a calendar to keep track of upcoming administration appointments. Encouraged patient to call their physician office and/or infusion center (if applicable) if they have a question on a missed dose.     What barriers to adherence does the patient have?    None identified    If barrier identified, what additional action was taken?   N/A no barriers identified    Patient was advised of the [facility] contact information ([place] [phone], [place] [phone], [facility] [phone]). Patient confirmed understanding of education conducted during assessment. All patient questions and concerns were addressed to the best of my ability. Patient was encouraged to contact the [facility] with any questions or concerns.     Sean Greco, PharmD

## 2025-04-17 DIAGNOSIS — G31.84 MILD COGNITIVE IMPAIRMENT WITH MEMORY LOSS: ICD-10-CM

## 2025-04-17 DIAGNOSIS — F02.A0 MILD ALZHEIMER'S DEMENTIA, UNSPECIFIED TIMING OF DEMENTIA ONSET, UNSPECIFIED WHETHER BEHAVIORAL, PSYCHOTIC, OR MOOD DISTURBANCE OR ANXIETY: ICD-10-CM

## 2025-04-17 DIAGNOSIS — G30.9 MILD ALZHEIMER'S DEMENTIA, UNSPECIFIED TIMING OF DEMENTIA ONSET, UNSPECIFIED WHETHER BEHAVIORAL, PSYCHOTIC, OR MOOD DISTURBANCE OR ANXIETY: ICD-10-CM

## 2025-04-28 ENCOUNTER — DOCUMENTATION (OUTPATIENT)
Dept: INFUSION THERAPY | Facility: CLINIC | Age: 80
End: 2025-04-28
Payer: MEDICARE

## 2025-04-28 NOTE — PROGRESS NOTES
"CLINICAL CLEARANCE FOR OUTPATIENT INFUSION      Patient to be scheduled for New Start of Donanemab (Kisunla) Infusion    For a diagnosis of Alzheimer's Disease     Labs / Scans Needed Prior to Initiation…    PET or lumbar puncture to confirm presence of amyloid beta pathology prior to initiation completed? Yes    Apolipoprotein E ?4 (ApoE ?4) status testing completed? Yes - SCANNED INTO MEDIA    Brain MRI prior to initiation [within 12 months] completed? Yes  === 06/27/23 ===    MR PROSTATE LEXIE BOUNDARIES    - Impression -  1. No evidence of clinical significant prostatic neoplasm.  2. Diffuse non nodular hypointensities within the peripheral zone,  without evidence of focally restricted diffusion (PI-RADS 2).  3. Indeterminate T2 hyperintense lesion between the left adductor  musculature which may represent a bursa versus nerve sheath tumor.    I personally reviewed the images/study and I agree with the findings  as stated. This study was interpreted at [place], [place]. - need to confirm with neuro timing    (If NO to any of the above labs/scans clarify with prescribing provider prior to proceeding)    Baseline Labs for Review (as available):    CBC (baseline if available):   Lab Results   Component Value Date    WBC 4.8 04/30/2024    HGB 12.9 (L) 04/30/2024    HCT 39.1 (L) 04/30/2024    MCV 96 04/30/2024     04/30/2024        Coagulation screen, PT INR (baseline if available):   No results found for: \"INR\", \"PROTIME\"     Neuro patient eligibility checklist scanned in? Yes      Are any of the following blood thinning medications on patients current medication list: warfarin, pradaxa, xarelto, eliquis, savaysa, lixiana? No    (If YES clarify with prescribing provider prior to proceeding)   *okay if on aspirin, clopid, brillinta, Aggrenox    [unfilled]     Date of last infusion:  (If continuation)   Due: new start    Okay to schedule for treatment as ordered by " prescribing provider.

## 2025-05-06 ENCOUNTER — APPOINTMENT (OUTPATIENT)
Dept: INFUSION THERAPY | Facility: CLINIC | Age: 80
End: 2025-05-06
Payer: MEDICARE

## 2025-05-06 VITALS
HEART RATE: 67 BPM | BODY MASS INDEX: 20.78 KG/M2 | DIASTOLIC BLOOD PRESSURE: 71 MMHG | OXYGEN SATURATION: 97 % | WEIGHT: 149 LBS | TEMPERATURE: 97.7 F | SYSTOLIC BLOOD PRESSURE: 115 MMHG | RESPIRATION RATE: 18 BRPM

## 2025-05-06 DIAGNOSIS — G31.84 MILD COGNITIVE IMPAIRMENT WITH MEMORY LOSS: ICD-10-CM

## 2025-05-06 PROCEDURE — 96413 CHEMO IV INFUSION 1 HR: CPT | Performed by: NURSE PRACTITIONER

## 2025-05-06 RX ORDER — ALBUTEROL SULFATE 0.83 MG/ML
3 SOLUTION RESPIRATORY (INHALATION) AS NEEDED
OUTPATIENT
Start: 2025-06-03

## 2025-05-06 RX ORDER — EPINEPHRINE 0.3 MG/.3ML
0.3 INJECTION SUBCUTANEOUS EVERY 5 MIN PRN
OUTPATIENT
Start: 2025-06-03

## 2025-05-06 RX ORDER — FAMOTIDINE 10 MG/ML
20 INJECTION, SOLUTION INTRAVENOUS ONCE AS NEEDED
OUTPATIENT
Start: 2025-06-03

## 2025-05-06 RX ORDER — DIPHENHYDRAMINE HYDROCHLORIDE 50 MG/ML
50 INJECTION, SOLUTION INTRAMUSCULAR; INTRAVENOUS AS NEEDED
OUTPATIENT
Start: 2025-06-03

## 2025-05-06 ASSESSMENT — ENCOUNTER SYMPTOMS
HEADACHES: 0
LIGHT-HEADEDNESS: 0
FREQUENCY: 0
NUMBNESS: 0
UNEXPECTED WEIGHT CHANGE: 0
DIARRHEA: 0
WHEEZING: 0
ABDOMINAL PAIN: 0
FATIGUE: 0
DYSURIA: 0
FEVER: 0
WOUND: 0
NERVOUS/ANXIOUS: 1
APPETITE CHANGE: 0
COUGH: 0
TROUBLE SWALLOWING: 0
MYALGIAS: 0
BLOOD IN STOOL: 0
SORE THROAT: 0
HEMATURIA: 0
LEG SWELLING: 0
ARTHRALGIAS: 0
VOICE CHANGE: 0
SHORTNESS OF BREATH: 0
BRUISES/BLEEDS EASILY: 0
CHILLS: 0
EYE PROBLEMS: 0
CONSTIPATION: 0
PALPITATIONS: 0
NAUSEA: 0
EXTREMITY WEAKNESS: 0
DIZZINESS: 0
VOMITING: 0

## 2025-05-06 ASSESSMENT — PAIN SCALES - GENERAL: PAINLEVEL_OUTOF10: 0-NO PAIN

## 2025-05-06 NOTE — PROGRESS NOTES
[facility]   Infusion Clinic Note   Date: May 6, 2025   Name: Luis Bain  : 1945   MRN: [id number]         Reason for Visit: OP Infusion (Kisunla 700 mg  first dose induction)         Today: We administered donanemab-azbt.       Ordered By: Natan Ramirez MD       For a Diagnosis of: Mild cognitive impairment with memory loss       At today's visit patient accompanied by: Spouse       Today's Vitals:   Vitals:    25 0941 25 1049 25 1156   BP: (!) 138/91 113/65 115/71   Pulse: 75 60 67   Resp: 18 18 18   Temp: 36.4 °C (97.6 °F) 36.6 °C (97.8 °F) 36.5 °C (97.7 °F)   SpO2: 99% 97%    Weight: 67.6 kg (149 lb)     PainSc: 0-No pain               Pre - Treatment Checklist:      - Previous reaction to current treatment: n/a      (Assess patient for the concerns below. Document provider notification as appropriate).  - Active or recent infection with/without current antibiotic use: no  - Recent or planned invasive dental work: no  - Recent or planned surgeries: no  - Recently received or plans to receive vaccinations: no  - Has treatment related toxicities: no  - Any chance may be pregnant:  n/a      Pain: 0   - Is the pain different from normal: no   - Is prescribing Doctor aware:  n/a      Labs: Reviewed       Fall Risk Screening: Zepeda Fall Risk  History of Falling, Immediate or Within 3 Months: No  Secondary Diagnosis: No  Ambulatory Aid: Walks without aid/bedrest/nurse assist  Intravenous Therapy/Heparin Lock: No  Gait/Transferring: Normal/bedrest/immobile  Mental Status: Oriented to own ability  Zepeda Fall Risk Score: 0       Review Of Systems:  Review of Systems   Constitutional:  Negative for appetite change, chills, fatigue, fever and unexpected weight change.   HENT:   Negative for hearing loss, mouth sores, sore throat, tinnitus, trouble swallowing and voice change.    Eyes:  Negative for eye problems.   Respiratory:  Negative for cough, shortness of  breath and wheezing.    Cardiovascular:  Negative for chest pain, leg swelling and palpitations.   Gastrointestinal:  Negative for abdominal pain, blood in stool, constipation, diarrhea, nausea and vomiting.   Genitourinary:  Negative for dysuria, frequency and hematuria.    Musculoskeletal:  Negative for arthralgias and myalgias.   Skin:  Negative for itching, rash and wound.   Neurological:  Negative for dizziness, extremity weakness, headaches, light-headedness and numbness.   Hematological:  Does not bruise/bleed easily.   Psychiatric/Behavioral:  The patient is nervous/anxious.          Infusion Readiness:  - Assessment Concerns Related to Infusion: No  - Provider notified: n/a      New Patient Education:    NEW PATIENT MEDICATION EDUCATION PT PROVIDED WITH WRITTEN ([employer] PT EDUCATION SHEET) AND VERBAL EDUCATION REGARDING MEDICATION GIVEN. VERIFIED MEDICATION NAME WITH PATIENT AND DISCUSSED REASON FOR USE. BRIEFLY DISCUSSED HOW MEDICATION WORKS AND EDUCATED ON GOAL OF TREATMENT, FREQUENCY OF TREATMENT, ADVERSE RXN'S AND COMMON SIDE EFFECTS TO MONITOR FOR. INSTRUCTED PT TO ASSURE THAT ALL PROVIDERS INCLUDING DENTISTS ARE AWARE OF MEDICATION RECEIVED. DISCUSSED FLOW OF VISIT AND ORIENTED TO INFUSION CENTER. PT VERBALIZES UNDERSTANDING. CALL LIGHT PROVIDED AND PT AWARE TO ALERT STAFF OF ANY CONCERNS DURING TREATMENT.        Treatment Conditions & Drug Specific Questions:    Donanemab (KISUNLA) - FIRST INFUSION Nurses Note - First Infusion    First Infusion Only: Obtain a baseline of the patients following symptoms:        Headache:       Do you have a history of headaches? No       If Yes How often do you have headaches? \na       If Yes Rate the severity of your headaches when you have them? 0           Mental Status:        Patient presents as alert and oriented to:  A&O x's 4 (select all that apply and record errors)          Visual Changes:       Do you experience blurred or double vision? No          Dizziness:       Do you feel dizzy or lightheaded on a routine basis? No             Nausea:        Do you experience feelings of nausea regularly? No        Gait Difficulty:      Do you feel stiff or off balance when you walk? No      Do you feel weak or have numbness/tingling in your hands or feet? No       Speech:        Is your speech slurred or do you have trouble finding the right words? Yes, troubles finding words      Seizures:     Have you ever had a seizure or fainted? No      Medications:    Are you taking any of the following blood thinning medications: warfarin, pradaxa, xarelto, eliquis, savaysa, lixiana? No    (IF YES HOLD TODAYS INFUSION AND CONTACT PRESCRIBING PROVIDER)   *okay if on aspirin, clopid, brillinta, Aggrenox    Pre-Medications (Prior to first 5 infusions only; Unless known history reaction)    Did the patient take Tyelnol and Zyrtec at home prior to today's infusion (first 5 infusions only)? Yes, at 0900  (If NO, notify NP to place order)    Recommended Vitals/Observation:  Vitals: Obtain at start and end of infusion; at end of observation period and as needed.   Observation: 60 minute observation after the first 4 infusion then 30 minute observation after all subsequent infusions Observation complete.             Weight Based Drug Calculations:    WEIGHT BASED DRUGS: NOT APPLICABLE / FLAT DOSE       Post Treatment: Patient tolerated treatment without issue and was discharged in no apparent distress.      Note Authored / Patient Cared for By: Martha Frias RN   _________________________________________________________________________    Note authored and patient cared for by: Martha Frias RN   Note/Encounter reviewed by: Carmen REGALADO NP. This provider on site at time of patient infusion. Infusion staff to notify this provider of any questions, concerns, abnormals or issues during infusion.    Final check of medication completed by this [facility] / or on-site pharmacist with  administering nurse using positive identification prior to administration. Final appearance of product checked for accuracy and conformity to the formula of the prepared product. Assured use of correct ingredients, accurate calculations and precise measurements under appropriate conditions and procedures.    No issues reported during today's encounter. Pt. tolerated infusion without difficulty. Pt. not independently evaluated by this provider during today's encounter.  (Carmen REGALADO NP)

## 2025-05-06 NOTE — PATIENT INSTRUCTIONS
Today :We administered donanemab-azbt.     For:   1. Mild cognitive impairment with memory loss         Your next appointment is due in:  6/3/2025        Please read the  Medication Guide that was given to you and reviewed during todays visit.     (Tell all doctors including dentists that you are taking this medication)     Go to the emergency room or call 911 if:  -You have signs of allergic reaction:   -Rash, hives, itching.   -Swollen, blistered, peeling skin.   -Swelling of face, lips, mouth, tongue or throat.   -Tightness of chest, trouble breathing, swallowing or talking     Call your doctor:  - If IV / injection site gets red, warm, swollen, itchy or leaks fluid or pus.     (Leave dressing on your IV site for at least 2 hours and keep area clean and dry  - If you get sick or have symptoms of infection or are not feeling well for any reason.    (Wash your hands often, stay away from people who are sick)  - If you have side effects from your medication that do not go away or are bothersome.     (Refer to the teaching your nurse gave you for side effects to call your doctor about)    - Common side effects may include:  stuffy nose, headache, feeling tired, muscle aches, upset stomach  - Before receiving any vaccines     - Call [facility] at   If:  - You get sick, are on antibiotics, have had a recent vaccine, have surgery or dental work and your doctor wants your visit rescheduled.  - You need to cancel and reschedule your visit for any reason. Call at least 2 days before your visit if you need to cancel.   - Your insurance changes before your next visit.    (We will need to get approval from your new insurance. This can take up to two weeks.)     The [facility] is opened Monday thru Friday. We are closed on weekends and holidays.   Voice mail will take your call if the center is closed. If you leave a message please allow 24 hours for a call back during weekdays. If you  leave a message on a weekend/holiday, we will call you back the next business day.    A pharmacist is available Monday - Friday from 8:30AM to 3:30PM to help answer any questions you may have about your prescriptions(s). Please call pharmacy at:    [facility]: [phone]  [facility]: [phone]  [facility]: [phone]

## 2025-05-08 ENCOUNTER — SPECIALTY PHARMACY (OUTPATIENT)
Dept: NEUROLOGY | Facility: HOSPITAL | Age: 80
End: 2025-05-08
Payer: MEDICARE

## 2025-05-08 NOTE — PROGRESS NOTES
Post First Kisunla Infusion Follow Up    Patient Information:  Luis Bain is a 79 y.o. year old male patient receiving infused Kisunla therapy at [facility].     Date of first Infusion: 05/06/2025    Follow-up after Kisunla infusion.  Subjective:    Reported complications during infusion visit: none  Reported side effects since infusion: none  Infusion center experience: great  Any concerns or questions about the treatment? none    Assessment:    Possible side effects or reactions related to the infusion.  Any issues with adherence, understanding, or managing the treatment regimen: No  Any upcoming trips or adjustments to infusion appointment schedule: No  Plan:    Next Steps:    If no significant side effects, reassure the patient and provide instructions for any ongoing treatments and monitoring.  If side effects present, suggest appropriate interventions (e.g., symptomatic treatment, contacting physician).  Re-enforced upcoming infusion dates and MRIs prior to 2nd, 3rd, 4th, and 7th infusions with subsequent FUVs prior to continuing infusions.   Follow-Up:    Schedule the next follow-up visit/phone call if necessary.  Remind patient about follow-up with their healthcare provider if any concerning symptoms arise.  Patient Education:    Reinforced signs and symptoms of serious side effects (e.g., infusion-related reactions, ARIA).  Encourage the patient to call if they experience any new or worsening symptoms.      Sean Greco, SigifredoD

## 2025-05-13 ENCOUNTER — HOSPITAL ENCOUNTER (OUTPATIENT)
Dept: RADIOLOGY | Facility: HOSPITAL | Age: 80
Discharge: HOME | End: 2025-05-13
Payer: MEDICARE

## 2025-05-13 DIAGNOSIS — F02.A0 MILD ALZHEIMER'S DEMENTIA, UNSPECIFIED TIMING OF DEMENTIA ONSET, UNSPECIFIED WHETHER BEHAVIORAL, PSYCHOTIC, OR MOOD DISTURBANCE OR ANXIETY: ICD-10-CM

## 2025-05-13 DIAGNOSIS — G30.9 MILD ALZHEIMER'S DEMENTIA, UNSPECIFIED TIMING OF DEMENTIA ONSET, UNSPECIFIED WHETHER BEHAVIORAL, PSYCHOTIC, OR MOOD DISTURBANCE OR ANXIETY: ICD-10-CM

## 2025-05-13 DIAGNOSIS — G31.84 MILD COGNITIVE IMPAIRMENT WITH MEMORY LOSS: ICD-10-CM

## 2025-05-13 PROCEDURE — 0866T QUAN MRI ALYS BRN W/DX MRI: CPT | Performed by: RADIOLOGY

## 2025-05-13 PROCEDURE — 0866T QUAN MRI ALYS BRN W/DX MRI: CPT

## 2025-05-13 PROCEDURE — 70551 MRI BRAIN STEM W/O DYE: CPT | Performed by: RADIOLOGY

## 2025-05-25 DIAGNOSIS — I10 HYPERTENSION, UNSPECIFIED TYPE: ICD-10-CM

## 2025-05-25 DIAGNOSIS — R05.9 COUGH, UNSPECIFIED TYPE: ICD-10-CM

## 2025-05-27 RX ORDER — AMLODIPINE BESYLATE 2.5 MG/1
2.5 TABLET ORAL DAILY
Qty: 30 TABLET | Refills: 1 | Status: SHIPPED | OUTPATIENT
Start: 2025-05-27

## 2025-05-30 ENCOUNTER — TELEPHONE (OUTPATIENT)
Dept: PRIMARY CARE | Facility: CLINIC | Age: 80
End: 2025-05-30
Payer: MEDICARE

## 2025-05-30 DIAGNOSIS — R09.81 NASAL CONGESTION: Primary | ICD-10-CM

## 2025-05-30 RX ORDER — FLUTICASONE PROPIONATE 50 MCG
1 SPRAY, SUSPENSION (ML) NASAL DAILY
Qty: 16 G | Refills: 0 | Status: SHIPPED | OUTPATIENT
Start: 2025-05-30 | End: 2026-05-30

## 2025-05-30 RX ORDER — AMOXICILLIN 500 MG/1
500 CAPSULE ORAL EVERY 8 HOURS SCHEDULED
Qty: 30 CAPSULE | Refills: 0 | Status: SHIPPED | OUTPATIENT
Start: 2025-05-30 | End: 2025-06-09

## 2025-05-30 NOTE — TELEPHONE ENCOUNTER
Pt has some congestion, runny nose and sneezing , has been going on for a while, would like to know could call him something in because nothing over the counter is working   Last seen on 4/22/24  Upcoming visit 6/30/25

## 2025-06-02 DIAGNOSIS — G31.84 MILD COGNITIVE IMPAIRMENT WITH MEMORY LOSS: ICD-10-CM

## 2025-06-03 ENCOUNTER — APPOINTMENT (OUTPATIENT)
Dept: INFUSION THERAPY | Facility: CLINIC | Age: 80
End: 2025-06-03
Payer: MEDICARE

## 2025-06-03 VITALS
HEART RATE: 60 BPM | WEIGHT: 145.9 LBS | TEMPERATURE: 97.4 F | RESPIRATION RATE: 18 BRPM | BODY MASS INDEX: 20.35 KG/M2 | DIASTOLIC BLOOD PRESSURE: 79 MMHG | SYSTOLIC BLOOD PRESSURE: 130 MMHG | OXYGEN SATURATION: 98 %

## 2025-06-03 DIAGNOSIS — G31.84 MILD COGNITIVE IMPAIRMENT WITH MEMORY LOSS: ICD-10-CM

## 2025-06-03 PROCEDURE — 96413 CHEMO IV INFUSION 1 HR: CPT | Performed by: NURSE PRACTITIONER

## 2025-06-03 RX ORDER — EPINEPHRINE 0.3 MG/.3ML
0.3 INJECTION SUBCUTANEOUS EVERY 5 MIN PRN
OUTPATIENT
Start: 2025-07-01

## 2025-06-03 RX ORDER — DIPHENHYDRAMINE HYDROCHLORIDE 50 MG/ML
50 INJECTION, SOLUTION INTRAMUSCULAR; INTRAVENOUS AS NEEDED
OUTPATIENT
Start: 2025-07-01

## 2025-06-03 RX ORDER — ALBUTEROL SULFATE 0.83 MG/ML
3 SOLUTION RESPIRATORY (INHALATION) AS NEEDED
OUTPATIENT
Start: 2025-07-01

## 2025-06-03 RX ORDER — FAMOTIDINE 10 MG/ML
20 INJECTION, SOLUTION INTRAVENOUS ONCE AS NEEDED
OUTPATIENT
Start: 2025-07-01

## 2025-06-03 ASSESSMENT — ENCOUNTER SYMPTOMS
WOUND: 0
HEMATURIA: 0
CHILLS: 0
DYSURIA: 0
HEADACHES: 0
LEG SWELLING: 0
DIZZINESS: 0
MYALGIAS: 0
LIGHT-HEADEDNESS: 0
SHORTNESS OF BREATH: 0
BLOOD IN STOOL: 0
CONSTIPATION: 0
TROUBLE SWALLOWING: 0
EXTREMITY WEAKNESS: 0
APPETITE CHANGE: 0
DIARRHEA: 0
EYE PROBLEMS: 0
PALPITATIONS: 0
ARTHRALGIAS: 0
COUGH: 1
WHEEZING: 0
FEVER: 0
BRUISES/BLEEDS EASILY: 0
FREQUENCY: 0
VOMITING: 0
VOICE CHANGE: 0
NUMBNESS: 0
UNEXPECTED WEIGHT CHANGE: 0
NAUSEA: 0
SORE THROAT: 0
ABDOMINAL PAIN: 0
FATIGUE: 0

## 2025-06-03 ASSESSMENT — PAIN SCALES - GENERAL: PAINLEVEL_OUTOF10: 0-NO PAIN

## 2025-06-03 NOTE — PROGRESS NOTES
[facility]   Infusion Clinic Note   Date: Marlen 3, 2025   Name: Luis Bain  : 1945   MRN: [id number]         Reason for Visit: OP Infusion (Kisunla 700 mg every 28 days Dose #2)         Today: We administered donanemab-azbt.       Ordered By: Natan Ramirez MD       For a Diagnosis of: Mild cognitive impairment with memory loss       At today's visit patient accompanied by: Wife      Today's Vitals:   Vitals:    25 0955 25 1115 25 1158   BP: 127/77 110/69 130/79   Pulse: 69 60 60   Resp: 18 18 18   Temp: 36.2 °C (97.2 °F) 36.3 °C (97.4 °F) 36.3 °C (97.4 °F)   SpO2: 97% 98% 98%   Weight: 66.2 kg (145 lb 14.4 oz)     PainSc: 0-No pain               Pre - Treatment Checklist:      - Previous reaction to current treatment: no      (Assess patient for the concerns below. Document provider notification as appropriate).  - Active or recent infection with/without current antibiotic use: no  - Recent or planned invasive dental work: no  - Recent or planned surgeries: no  - Recently received or plans to receive vaccinations: no  - Has treatment related toxicities: no  - Any chance may be pregnant:  n/a      Pain: 0   - Is the pain different from normal: no   - Is prescribing Doctor aware:  n/a      Labs: Reviewed       Fall Risk Screening: Zepeda Fall Risk  History of Falling, Immediate or Within 3 Months: No  Secondary Diagnosis: Yes  Ambulatory Aid: Walks without aid/bedrest/nurse assist  Intravenous Therapy/Heparin Lock: No  Gait/Transferring: Normal/bedrest/immobile  Mental Status: Oriented to own ability  Zepeda Fall Risk Score: 15       Review Of Systems:  Review of Systems   Constitutional:  Negative for appetite change, chills, fatigue, fever and unexpected weight change.   HENT:   Negative for hearing loss, mouth sores, sore throat, tinnitus, trouble swallowing and voice change.         Sinus congestion/ drainage related to allergies   Eyes:  Negative for eye  problems.   Respiratory:  Positive for cough (from post nasl drip). Negative for shortness of breath and wheezing.    Cardiovascular:  Negative for chest pain, leg swelling and palpitations.   Gastrointestinal:  Negative for abdominal pain, blood in stool, constipation, diarrhea, nausea and vomiting.   Genitourinary:  Negative for dysuria, frequency and hematuria.    Musculoskeletal:  Negative for arthralgias and myalgias.   Skin:  Negative for itching, rash and wound.   Neurological:  Negative for dizziness, extremity weakness, headaches, light-headedness and numbness.   Hematological:  Does not bruise/bleed easily.         Infusion Readiness:  - Assessment Concerns Related to Infusion: No  - Provider notified: n/a      New Patient Education:    N/A (returning patient for continuation of therapy. Ongoing education provided as needed.)        Treatment Conditions & Drug Specific Questions:    Donanemab (KISUNLA) SUBSEQUENT INFUSIONS   (Unless otherwise specified on patient specific therapy plan):    TREATMENT CONDITIONS. CONFIRM BEFORE TODAY'S INFUSION:  - Today is infusion number: 2    - Patient must have a new brain MRI completed before the 2nd, 3rd, 4th and 7th  infusions AND prescribers note on MRI, recommending continuation of infusion therapy.     - Patient does not have symptoms suggestive of ARIA (amyloid-related imaging abnormalities.    - Patient does not have symptoms of treatment related toxicities.    DRUG SPECIFIC QUESTIONS:  - MRI completed prior to the 2nd, 3rd, 4th and 7th  infusions. MRI reviewed by neurology AND note in chart recommending continuation of infusion therapy?  Yes    (IF NO CONTACT PRESCRIBING PROVIDER PRIOR TO PROCEEDING WITH INFUSION)    - Does the patient have any symptoms suggestive of amyloid-related imaging abnormalities (ARIA) which may include new/worsening headache, visual changes, confusion, unsteady gait / gait   difficult, dizziness, nausea, seizure or any neurological  changes? No     (IF YES CONTACT PRESCRIBING PROVIDER PRIOR TO PROCEEDING WITH INFUSION)    - Educate patients to report signs and symptoms of CNS changes which may include new/worsening headache, visual changes, confusion, unsteady gait / gait difficult, dizziness, nausea, seizure or any neurological changes? Yes    Pre-Medications (Prior to first 5 infusions only; Unless known history reaction)    Did the patient take Tyelnol and Zyrtec at home prior to today's infusion (first 5 infusions only)? Yes, at 0925  (If NO, notify NP to place order)    Recommended Vitals/Observation:  Vitals: Obtain at start and end of infusion; at end of observation period and as needed.   Observation: 60 minute observation after the first 4 infusion then 30 minute observation after all subsequent infusions Observation:[id number]}         Weight Based Drug Calculations:    WEIGHT BASED DRUGS: NOT APPLICABLE / FLAT DOSE       Post Treatment: Patient tolerated treatment without issue and was discharged in no apparent distress.      Note Authored / Patient Cared for By: Martha Frias RN   One hour observation complete, no signs or symptoms of allergic reaction.

## 2025-06-05 ENCOUNTER — HOSPITAL ENCOUNTER (OUTPATIENT)
Dept: RADIOLOGY | Facility: HOSPITAL | Age: 80
Discharge: HOME | End: 2025-06-05
Payer: MEDICARE

## 2025-06-05 DIAGNOSIS — G31.84 MILD COGNITIVE IMPAIRMENT WITH MEMORY LOSS: ICD-10-CM

## 2025-06-05 DIAGNOSIS — G30.9 MILD ALZHEIMER'S DEMENTIA, UNSPECIFIED TIMING OF DEMENTIA ONSET, UNSPECIFIED WHETHER BEHAVIORAL, PSYCHOTIC, OR MOOD DISTURBANCE OR ANXIETY: ICD-10-CM

## 2025-06-05 DIAGNOSIS — F02.A0 MILD ALZHEIMER'S DEMENTIA, UNSPECIFIED TIMING OF DEMENTIA ONSET, UNSPECIFIED WHETHER BEHAVIORAL, PSYCHOTIC, OR MOOD DISTURBANCE OR ANXIETY: ICD-10-CM

## 2025-06-05 PROCEDURE — 70551 MRI BRAIN STEM W/O DYE: CPT

## 2025-06-05 NOTE — RESULT ENCOUNTER NOTE
I have reviewed MRI brain performed on 6/5/2025 as part of safety protocol for anti-amyloid therapy. 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     No other evidence of hemorrhage or hemosiderin deposition or cerebral edema. There is no MRI contraindication to starting/continuing anti-amyloid therapy.    Discussed with patient and his wife - he has not experienced any symptoms suggestive of ARIA. Counseled to monitor for any new symptoms, e.g. any new/worsening headaches, confusion, unsteady or worsening gait, dizziness, nausea, vision changes, seizures or new neurological changes, and contact the office if needed. Recommended to go to ER or call 911 for severe symptoms. They verbalized understanding and agreed to continue infusion and MRI as scheduled.

## 2025-06-11 DIAGNOSIS — E78.5 DYSLIPIDEMIA: ICD-10-CM

## 2025-06-11 DIAGNOSIS — N40.0 BENIGN PROSTATIC HYPERPLASIA WITHOUT LOWER URINARY TRACT SYMPTOMS: ICD-10-CM

## 2025-06-12 RX ORDER — SIMVASTATIN 10 MG/1
10 TABLET, FILM COATED ORAL NIGHTLY
Qty: 90 TABLET | Refills: 0 | Status: SHIPPED | OUTPATIENT
Start: 2025-06-12

## 2025-06-12 RX ORDER — FINASTERIDE 5 MG/1
TABLET, FILM COATED ORAL
Qty: 90 TABLET | Refills: 0 | Status: SHIPPED | OUTPATIENT
Start: 2025-06-12

## 2025-06-27 PROBLEM — G89.29 CHRONIC PAIN OF RIGHT KNEE: Status: RESOLVED | Noted: 2023-09-08 | Resolved: 2025-06-27

## 2025-06-27 PROBLEM — G62.9 NEUROPATHY: Status: RESOLVED | Noted: 2023-09-08 | Resolved: 2025-06-27

## 2025-06-27 PROBLEM — M43.19 SPONDYLOLISTHESIS, MULTIPLE SITES IN SPINE: Status: RESOLVED | Noted: 2023-09-08 | Resolved: 2025-06-27

## 2025-06-27 PROBLEM — D72.819 LEUKOPENIA: Status: RESOLVED | Noted: 2023-09-08 | Resolved: 2025-06-27

## 2025-06-27 PROBLEM — M48.00 CENTRAL STENOSIS OF SPINAL CANAL: Status: RESOLVED | Noted: 2023-09-08 | Resolved: 2025-06-27

## 2025-06-27 PROBLEM — M25.561 CHRONIC PAIN OF RIGHT KNEE: Status: RESOLVED | Noted: 2023-09-08 | Resolved: 2025-06-27

## 2025-06-27 PROBLEM — R53.83 FATIGUE: Status: RESOLVED | Noted: 2023-05-01 | Resolved: 2025-06-27

## 2025-06-27 PROBLEM — I10 ESSENTIAL HYPERTENSION: Status: RESOLVED | Noted: 2023-05-01 | Resolved: 2025-06-27

## 2025-06-27 PROBLEM — R51.9 HEADACHE: Status: RESOLVED | Noted: 2023-05-01 | Resolved: 2025-06-27

## 2025-06-27 PROBLEM — M43.06 LUMBAR SPONDYLOLYSIS: Status: RESOLVED | Noted: 2023-09-08 | Resolved: 2025-06-27

## 2025-06-27 PROBLEM — M25.50 JOINT PAIN: Status: RESOLVED | Noted: 2023-09-08 | Resolved: 2025-06-27

## 2025-06-27 PROBLEM — M54.16 LUMBAR RADICULOPATHY: Status: RESOLVED | Noted: 2023-07-10 | Resolved: 2025-06-27

## 2025-06-27 PROBLEM — E78.5 HYPERLIPIDEMIA: Status: RESOLVED | Noted: 2023-05-01 | Resolved: 2025-06-27

## 2025-06-30 ENCOUNTER — APPOINTMENT (OUTPATIENT)
Dept: PRIMARY CARE | Facility: CLINIC | Age: 80
End: 2025-06-30
Payer: MEDICARE

## 2025-06-30 VITALS
HEIGHT: 71 IN | BODY MASS INDEX: 20.16 KG/M2 | WEIGHT: 144 LBS | HEART RATE: 62 BPM | DIASTOLIC BLOOD PRESSURE: 78 MMHG | RESPIRATION RATE: 16 BRPM | OXYGEN SATURATION: 98 % | SYSTOLIC BLOOD PRESSURE: 130 MMHG

## 2025-06-30 DIAGNOSIS — F02.A0 MILD EARLY ONSET ALZHEIMER'S DEMENTIA, UNSPECIFIED WHETHER BEHAVIORAL, PSYCHOTIC, OR MOOD DISTURBANCE OR ANXIETY: ICD-10-CM

## 2025-06-30 DIAGNOSIS — E78.5 DYSLIPIDEMIA: ICD-10-CM

## 2025-06-30 DIAGNOSIS — E55.9 VITAMIN D DEFICIENCY: ICD-10-CM

## 2025-06-30 DIAGNOSIS — J30.9 ALLERGIC RHINITIS, UNSPECIFIED SEASONALITY, UNSPECIFIED TRIGGER: ICD-10-CM

## 2025-06-30 DIAGNOSIS — G30.0 MILD EARLY ONSET ALZHEIMER'S DEMENTIA, UNSPECIFIED WHETHER BEHAVIORAL, PSYCHOTIC, OR MOOD DISTURBANCE OR ANXIETY: ICD-10-CM

## 2025-06-30 DIAGNOSIS — Z00.00 MEDICARE ANNUAL WELLNESS VISIT, SUBSEQUENT: ICD-10-CM

## 2025-06-30 DIAGNOSIS — I10 HYPERTENSION, UNSPECIFIED TYPE: ICD-10-CM

## 2025-06-30 DIAGNOSIS — N40.0 BENIGN PROSTATIC HYPERPLASIA, UNSPECIFIED WHETHER LOWER URINARY TRACT SYMPTOMS PRESENT: ICD-10-CM

## 2025-06-30 PROCEDURE — 1170F FXNL STATUS ASSESSED: CPT | Performed by: INTERNAL MEDICINE

## 2025-06-30 PROCEDURE — 1126F AMNT PAIN NOTED NONE PRSNT: CPT | Performed by: INTERNAL MEDICINE

## 2025-06-30 PROCEDURE — 3078F DIAST BP <80 MM HG: CPT | Performed by: INTERNAL MEDICINE

## 2025-06-30 PROCEDURE — 3075F SYST BP GE 130 - 139MM HG: CPT | Performed by: INTERNAL MEDICINE

## 2025-06-30 PROCEDURE — 1160F RVW MEDS BY RX/DR IN RCRD: CPT | Performed by: INTERNAL MEDICINE

## 2025-06-30 PROCEDURE — 1159F MED LIST DOCD IN RCRD: CPT | Performed by: INTERNAL MEDICINE

## 2025-06-30 PROCEDURE — 1157F ADVNC CARE PLAN IN RCRD: CPT | Performed by: INTERNAL MEDICINE

## 2025-06-30 PROCEDURE — 1036F TOBACCO NON-USER: CPT | Performed by: INTERNAL MEDICINE

## 2025-06-30 PROCEDURE — 99213 OFFICE O/P EST LOW 20 MIN: CPT | Performed by: INTERNAL MEDICINE

## 2025-06-30 PROCEDURE — G0439 PPPS, SUBSEQ VISIT: HCPCS | Performed by: INTERNAL MEDICINE

## 2025-06-30 PROCEDURE — 1124F ACP DISCUSS-NO DSCNMKR DOCD: CPT | Performed by: INTERNAL MEDICINE

## 2025-06-30 RX ORDER — LEVOCETIRIZINE DIHYDROCHLORIDE 5 MG/1
5 TABLET, FILM COATED ORAL EVERY EVENING
Qty: 90 TABLET | Refills: 1 | Status: SHIPPED | OUTPATIENT
Start: 2025-06-30 | End: 2026-06-30

## 2025-06-30 ASSESSMENT — ACTIVITIES OF DAILY LIVING (ADL)
DRESSING: INDEPENDENT
TAKING_MEDICATION: INDEPENDENT
MANAGING_FINANCES: INDEPENDENT
BATHING: INDEPENDENT
GROCERY_SHOPPING: INDEPENDENT
DOING_HOUSEWORK: INDEPENDENT

## 2025-06-30 ASSESSMENT — PATIENT HEALTH QUESTIONNAIRE - PHQ9
1. LITTLE INTEREST OR PLEASURE IN DOING THINGS: NOT AT ALL
SUM OF ALL RESPONSES TO PHQ9 QUESTIONS 1 AND 2: 0
2. FEELING DOWN, DEPRESSED OR HOPELESS: NOT AT ALL

## 2025-06-30 ASSESSMENT — PAIN SCALES - GENERAL: PAINLEVEL_OUTOF10: 0-NO PAIN

## 2025-06-30 NOTE — PROGRESS NOTES
Patient ID: Luis Bain is a 80 y.o. male with PMH remarkable for lumbar radiculopathy, HTN, SBO s/p surgery, BPH, prostate nodule, memory issues  who presents to the office today for AMW visit.    HEALTH MAINTENANCE: Annual Medicare Wellness Physical  Last Office Visit: 4/22/24 labs ordered, 2D echo ordered  Last Labs: 1/9/25, last lipid panel and PSA 4/30/24  PSA Screening (starting at age 55 till 69): 4/30/24  Colonoscopy (45-75 or age 40 with 1st degree relative dx colon ca): 4/15/24, advised to repeat in 3 years due to tubular adenoma  Lung cancer screening (50-76 y/o x 20 pk yr for at least 20 yrs + current smoker OR quit in last 15 years, no CT w/I last year): NA  --seen by Natan Ramirez MD, for cognitive issues, last OV 3/10/25, discussed anti amyloid therapy, patient and family were going to consider it    HPI He states that he will receive his third anti amyloid therapy tomorrow. He states he is unsure if it is helping or not at this point. He states that he does word games with his wife to try to keep his memory in check. He states that he sometimes gets headaches. He denies any issues with urination. He states he walks a lot. He states he walks every morning. His appetite is ok. He states he has sleeping ok. He denies any depression. He states that he goes through 2-3 handkerchiefs per day, has constant runny nose. He states it is not new, and it was occurring prior to starting anti amyloid therapy. He states he wakes up with it.    Medical History[1]   Surgical History[2]   Social History[3]  Family History[4]   Immunization History   Administered Date(s) Administered    Flu vaccine, quadrivalent, high-dose, preservative free, age 65y+ (FLUZONE) 10/12/2020, 10/07/2021, 10/03/2022    Flu vaccine, trivalent, preservative free, HIGH-DOSE, age 65y+ (Fluzone) 09/11/2018, 10/09/2024    Influenza, Unspecified 10/03/2022    Moderna COVID-19 vaccine, 12 years and older (50mcg/0.5mL)(Spikevax)  "11/22/2023, 10/09/2024    Pfizer COVID-19 vaccine, bivalent, age 12 years and older (30 mcg/0.3 mL) 10/03/2022    Zoster vaccine, recombinant, adult (SHINGRIX) 11/22/2023, 04/17/2024     Review of Systems   Constitutional: Negative.    HENT:  Positive for rhinorrhea.    Respiratory: Negative.     Cardiovascular: Negative.    Gastrointestinal: Negative.    Genitourinary: Negative.    Musculoskeletal: Negative.    Skin: Negative.    Neurological: Negative.    Psychiatric/Behavioral: Negative.       Allergies[5]  Visit Vitals  /83 (BP Location: Right arm)   Pulse 62   Resp 16   Ht 1.803 m (5' 11\")   Wt 65.3 kg (144 lb)   SpO2 98%   BMI 20.08 kg/m²   Smoking Status Never   BSA 1.81 m²     Physical Exam  Vitals reviewed.   Constitutional:       Appearance: Normal appearance.   HENT:      Head: Normocephalic and atraumatic.      Nose: Nose normal.      Mouth/Throat:      Pharynx: Oropharynx is clear.   Cardiovascular:      Rate and Rhythm: Normal rate and regular rhythm.      Pulses: Normal pulses.      Heart sounds: Normal heart sounds.   Pulmonary:      Effort: Pulmonary effort is normal.      Breath sounds: Normal breath sounds.   Abdominal:      General: Bowel sounds are normal.      Palpations: Abdomen is soft.   Musculoskeletal:         General: Normal range of motion.      Cervical back: Normal range of motion.   Skin:     General: Skin is warm and dry.   Neurological:      General: No focal deficit present.      Mental Status: He is alert and oriented to person, place, and time.   Psychiatric:         Attention and Perception: Attention normal.         Mood and Affect: Mood normal.         Speech: Speech normal.         Behavior: Behavior normal. Behavior is cooperative.       Current Outpatient Medications   Medication Instructions    amLODIPine (NORVASC) 2.5 mg, oral, Daily, APPT NEEDED FOR FURTHER REFILLS    blood pressure test kit-large kit 1 each, miscellaneous, 3 times weekly    cholecalciferol, " vitamin D3, (VITAMIN D3 ORAL) Take by mouth.    donanemab-azbt (KISUNLA) 700 mg, intravenous, Every 28 days    donanemab-azbt (KISUNLA) 1,400 mg, intravenous, Every 28 days, After 3 induction doses    donepezil (ARICEPT) 10 mg, Nightly    finasteride (Proscar) 5 mg tablet TAKE 1 TABLET BY MOUTH DAILY (DO NOT CRUSH, CHEW, OR SPLIT)    fluticasone (Flonase) 50 mcg/actuation nasal spray 1 spray, Each Nostril, Daily, Shake gently. Before first use, prime pump. After use, clean tip and replace cap.    gabapentin (NEURONTIN) 300 mg, oral, Nightly    lansoprazole (PREVACID) 30 mg, Daily RT    multivitamin with minerals iron-free (Centrum Silver) 1 tablet, Daily    sildenafil (VIAGRA) 100 mg, As needed    simvastatin (ZOCOR) 10 mg, oral, Nightly     Lab Results   Component Value Date    WBC 4.8 04/30/2024    HGB 12.9 (L) 04/30/2024    HCT 39.1 (L) 04/30/2024     04/30/2024    CHOL 161 04/30/2024    TRIG 53 04/30/2024    HDL 56.2 04/30/2024    ALT 10 04/30/2024    AST 16 04/30/2024     04/30/2024    K 4.3 04/30/2024     04/30/2024    CREATININE 0.93 04/30/2024    BUN 22 04/30/2024    CO2 31 04/30/2024    TSH 3.27 04/30/2024    PSA 2.6 05/05/2023    HGBA1C 5.4 06/06/2022       Problem List Items Addressed This Visit           ICD-10-CM    BPH (benign prostatic hyperplasia) N40.0    Stable on Proscar         Relevant Orders    Prostate Spec.Ag,Screen    Dyslipidemia E78.5    Continue with statin         Relevant Orders    Lipid panel    Comprehensive metabolic panel    Hypertension I10    Continue with Amlodipine         Relevant Orders    CBC and Auto Differential    Comprehensive metabolic panel    Tsh With Reflex To Free T4 If Abnormal    Vitamin D deficiency E55.9    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 Vora. This note accurately reflects the work and decisions made by Dr. Vora.     I, Dr. Vora, attest all medical record entries made by the scribe were under my direction and were personally dictated by me. I have reviewed the chart and agree that the record accurately reflects my performance of the history, physical exam, and assessment and plan.           [1]   Past Medical History:  Diagnosis Date    BMI 20.0-20.9, adult 09/08/2023    Central stenosis of spinal canal 09/08/2023    Chronic pain of right knee 09/08/2023    Essential hypertension 05/01/2023    Fatigue 05/01/2023    Headache 05/01/2023    HL (hearing loss) 10 years ago    Hyperlipidemia 05/01/2023    Last Assessment & Plan: Formatting of this note might be different from the original. 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    Spondylolisthesis, multiple sites in spine 09/08/2023   [2]   Past Surgical History:  Procedure Laterality Date    COLON SURGERY      OTHER SURGICAL HISTORY  08/12/2020    Small bowel resection   [3]   Social History  Tobacco Use    Smoking status: Never    Smokeless tobacco: Never   Vaping Use    Vaping status: Never Used   Substance Use Topics    Alcohol use: Yes     Comment: Drink some wine with dinner    Drug use: Never   [4]    Family History  Problem Relation Name Age of Onset    Diabetes Mother      Bone cancer Mother           at age 68    Heart attack Father           at age 83    Bone cancer Mother Emani Bain     Cancer Mother Emani Bain     Diabetes Mother Emani Bain    [5]   Allergies  Allergen Reactions    Succinylcholine Unknown and Other     Pt states age 41s had surgery took over 3 hours to wake, heard people talking, could not move or respond. Had no further testing or eval, had no problem waking since that incident.    Succinylcholine Chloride Other and Unknown     Delayed Awakening    Pt states age 41s had surgery took over 3 hours to wake, heard people talking, could not move or respond. Had no further testing or eval, had no problem waking since that incident.

## 2025-06-30 NOTE — PATIENT INSTRUCTIONS
"It was great to see you in the office today! Here is what we discussed at your visit today:    Please continue to take your current medications     For your nasal drainage, try over the counter anti histamine such as Claritin, Xyzal, Zyrtec as long as there is no \"D\" for decongestant in the name-->Xyzal was sent in to your pharmacy    Please have your blood drawn in the next 1-2 weeks.   You need to be FASTING for 12 hours prior to blood draw.  Please contact your insurance company to ask about the best location to get blood drawn.  We will contact you with the results of your blood work and any necessary adjustments to your plan of care.  Iif you do not hear from us within 3-5 days of having your blood drawn, please call the [place] office at [phone].    Follow up in six months    "

## 2025-07-01 ENCOUNTER — APPOINTMENT (OUTPATIENT)
Dept: INFUSION THERAPY | Facility: CLINIC | Age: 80
End: 2025-07-01
Payer: MEDICARE

## 2025-07-01 VITALS
TEMPERATURE: 97.7 F | RESPIRATION RATE: 16 BRPM | HEART RATE: 57 BPM | OXYGEN SATURATION: 98 % | SYSTOLIC BLOOD PRESSURE: 126 MMHG | BODY MASS INDEX: 20 KG/M2 | DIASTOLIC BLOOD PRESSURE: 74 MMHG | WEIGHT: 143.4 LBS

## 2025-07-01 DIAGNOSIS — G31.84 MILD COGNITIVE IMPAIRMENT WITH MEMORY LOSS: ICD-10-CM

## 2025-07-01 PROCEDURE — 96365 THER/PROPH/DIAG IV INF INIT: CPT | Performed by: NURSE PRACTITIONER

## 2025-07-01 RX ORDER — ALBUTEROL SULFATE 0.83 MG/ML
3 SOLUTION RESPIRATORY (INHALATION) AS NEEDED
Status: CANCELLED | OUTPATIENT
Start: 2025-07-01

## 2025-07-01 RX ORDER — FAMOTIDINE 10 MG/ML
20 INJECTION, SOLUTION INTRAVENOUS ONCE AS NEEDED
Status: CANCELLED | OUTPATIENT
Start: 2025-07-01

## 2025-07-01 RX ORDER — EPINEPHRINE 0.3 MG/.3ML
0.3 INJECTION SUBCUTANEOUS EVERY 5 MIN PRN
Status: CANCELLED | OUTPATIENT
Start: 2025-07-01

## 2025-07-01 RX ORDER — DIPHENHYDRAMINE HYDROCHLORIDE 50 MG/ML
50 INJECTION, SOLUTION INTRAMUSCULAR; INTRAVENOUS AS NEEDED
Status: CANCELLED | OUTPATIENT
Start: 2025-07-01

## 2025-07-01 ASSESSMENT — PAIN SCALES - GENERAL: PAINLEVEL_OUTOF10: 0-NO PAIN

## 2025-07-01 ASSESSMENT — ENCOUNTER SYMPTOMS
WOUND: 0
APPETITE CHANGE: 0
PALPITATIONS: 0
FREQUENCY: 0
CONSTIPATION: 0
ABDOMINAL PAIN: 0
DYSURIA: 0
CHILLS: 0
HEADACHES: 0
VOICE CHANGE: 0
NAUSEA: 0
DIZZINESS: 0
COUGH: 0
BRUISES/BLEEDS EASILY: 0
ARTHRALGIAS: 0
UNEXPECTED WEIGHT CHANGE: 0
BLOOD IN STOOL: 0
SORE THROAT: 0
TROUBLE SWALLOWING: 0
EXTREMITY WEAKNESS: 0
NUMBNESS: 0
MYALGIAS: 0
DIARRHEA: 0
FEVER: 0
VOMITING: 0
SHORTNESS OF BREATH: 0
WHEEZING: 0
EYE PROBLEMS: 0
LEG SWELLING: 0
LIGHT-HEADEDNESS: 0
HEMATURIA: 0
FATIGUE: 1

## 2025-07-01 NOTE — PROGRESS NOTES
[facility]   Infusion Clinic Note   Date: 2025   Name: Luis Bain  : 1945   MRN: [id number]         Reason for Visit: OP Infusion (Kisunla 700 mg dose #3)         Today: We administered donanemab-azbt.       Ordered By: Natan Ramirez MD       For a Diagnosis of: Mild cognitive impairment with memory loss       At today's visit patient accompanied by: Wife      Today's Vitals:   Vitals:    25 0958 25 1105 25 1202   BP: 142/84 114/64 126/74   Pulse: 66 54 57   Resp: 18 18 16   Temp: 36.3 °C (97.4 °F) 36.4 °C (97.6 °F) 36.5 °C (97.7 °F)   SpO2: 100% 98%    Weight: 65 kg (143 lb 6.4 oz)     PainSc: 0-No pain               Pre - Treatment Checklist:      - Previous reaction to current treatment: no      (Assess patient for the concerns below. Document provider notification as appropriate).  - Active or recent infection with/without current antibiotic use: no  - Recent or planned invasive dental work: no  - Recent or planned surgeries: no  - Recently received or plans to receive vaccinations: yes, was instructed to get RSV vaccine, instructed to time the injection between infusions.  - Has treatment related toxicities: no  - Any chance may be pregnant:  n/a      Pain: 0   - Is the pain different from normal: no   - Is prescribing Doctor aware:  n/a      Labs: Reviewed       Fall Risk Screening: Zepeda Fall Risk  History of Falling, Immediate or Within 3 Months: No  Secondary Diagnosis: Yes  Ambulatory Aid: Walks without aid/bedrest/nurse assist  Intravenous Therapy/Heparin Lock: No  Gait/Transferring: Normal/bedrest/immobile  Mental Status: Oriented to own ability  Zepeda Fall Risk Score: 15       Review Of Systems:  Review of Systems   Constitutional:  Positive for fatigue. Negative for appetite change, chills, fever and unexpected weight change.   HENT:   Positive for hearing loss (bilat hearing aides). Negative for mouth sores, sore throat, tinnitus,  trouble swallowing and voice change.    Eyes:  Negative for eye problems.   Respiratory:  Negative for cough, shortness of breath and wheezing.    Cardiovascular:  Negative for chest pain, leg swelling and palpitations.   Gastrointestinal:  Negative for abdominal pain, blood in stool, constipation, diarrhea, nausea and vomiting.   Genitourinary:  Negative for dysuria, frequency and hematuria.    Musculoskeletal:  Negative for arthralgias and myalgias.   Skin:  Negative for itching, rash and wound.   Neurological:  Negative for dizziness, extremity weakness, headaches, light-headedness and numbness.        Forgetful at times. Some difficulty with recall of information, at baseline per spouse   Hematological:  Does not bruise/bleed easily.         Infusion Readiness:  - Assessment Concerns Related to Infusion: No  - Provider notified: n/a      New Patient Education:    N/A (returning patient for continuation of therapy. Ongoing education provided as needed.)        Treatment Conditions & Drug Specific Questions:    Donanemab (KISUNLA) SUBSEQUENT INFUSIONS   (Unless otherwise specified on patient specific therapy plan):    TREATMENT CONDITIONS. CONFIRM BEFORE TODAY'S INFUSION:  - Today is infusion number: 3    - Patient must have a new brain MRI completed before the 2nd, 3rd, 4th and 7th  infusions AND prescribers note on MRI, recommending continuation of infusion therapy.     - Patient does not have symptoms suggestive of ARIA (amyloid-related imaging abnormalities.    - Patient does not have symptoms of treatment related toxicities.    DRUG SPECIFIC QUESTIONS:  - MRI completed prior to the 2nd, 3rd, 4th and 7th  infusions. MRI reviewed by neurology AND note in chart recommending continuation of infusion therapy?  Yes    (IF NO CONTACT PRESCRIBING PROVIDER PRIOR TO PROCEEDING WITH INFUSION)    - Does the patient have any symptoms suggestive of amyloid-related imaging abnormalities (ARIA) which may include  new/worsening headache, visual changes, confusion, unsteady gait / gait   difficult, dizziness, nausea, seizure or any neurological changes? No     (IF YES CONTACT PRESCRIBING PROVIDER PRIOR TO PROCEEDING WITH INFUSION)    - Educate patients to report signs and symptoms of CNS changes which may include new/worsening headache, visual changes, confusion, unsteady gait / gait difficult, dizziness, nausea, seizure or any neurological changes? Yes    Pre-Medications (Prior to first 5 infusions only; Unless known history reaction)    Did the patient take Tyelnol and Zyrtec at home prior to today's infusion (first 5 infusions only)? Yes  (If NO, notify NP to place order)    Recommended Vitals/Observation:  Vitals: Obtain at start and end of infusion; at end of observation period and as needed.   Observation: 60 minute observation after the first 4 infusion then 30 minute observation after all subsequent infusions Observation:[id number]}         Weight Based Drug Calculations:    WEIGHT BASED DRUGS: NOT APPLICABLE / FLAT DOSE       Post Treatment: Patient tolerated treatment without issue and was discharged in no apparent distress.      Note Authored / Patient Cared for By: Martha Frias RN   Induction therapy plan complete.

## 2025-07-01 NOTE — PATIENT INSTRUCTIONS
Today :We administered donanemab-azbt.     For:   1. Mild cognitive impairment with memory loss         Your next appointment is due in:  7/29/2025        Please read the  Medication Guide that was given to you and reviewed during todays visit.     (Tell all doctors including dentists that you are taking this medication)     Go to the emergency room or call 911 if:  -You have signs of allergic reaction:   -Rash, hives, itching.   -Swollen, blistered, peeling skin.   -Swelling of face, lips, mouth, tongue or throat.   -Tightness of chest, trouble breathing, swallowing or talking     Call your doctor:  - If IV / injection site gets red, warm, swollen, itchy or leaks fluid or pus.     (Leave dressing on your IV site for at least 2 hours and keep area clean and dry  - If you get sick or have symptoms of infection or are not feeling well for any reason.    (Wash your hands often, stay away from people who are sick)  - If you have side effects from your medication that do not go away or are bothersome.     (Refer to the teaching your nurse gave you for side effects to call your doctor about)    - Common side effects may include:  stuffy nose, headache, feeling tired, muscle aches, upset stomach  - Before receiving any vaccines     - Call [facility] at   If:  - You get sick, are on antibiotics, have had a recent vaccine, have surgery or dental work and your doctor wants your visit rescheduled.  - You need to cancel and reschedule your visit for any reason. Call at least 2 days before your visit if you need to cancel.   - Your insurance changes before your next visit.    (We will need to get approval from your new insurance. This can take up to two weeks.)     The [facility] is opened Monday thru Friday. We are closed on weekends and holidays.   Voice mail will take your call if the center is closed. If you leave a message please allow 24 hours for a call back during weekdays. If you  leave a message on a weekend/holiday, we will call you back the next business day.    A pharmacist is available Monday - Friday from 8:30AM to 3:30PM to help answer any questions you may have about your prescriptions(s). Please call pharmacy at:    [facility]: [phone]  [facility]: [phone]  [facility]: [phone]

## 2025-07-02 ASSESSMENT — ENCOUNTER SYMPTOMS
NEUROLOGICAL NEGATIVE: 1
RHINORRHEA: 1
RESPIRATORY NEGATIVE: 1
MUSCULOSKELETAL NEGATIVE: 1
CONSTITUTIONAL NEGATIVE: 1
GASTROINTESTINAL NEGATIVE: 1
PSYCHIATRIC NEGATIVE: 1
CARDIOVASCULAR NEGATIVE: 1

## 2025-07-02 NOTE — ASSESSMENT & PLAN NOTE
Continue with Aricept  He has started anti amyloid therapy, tolerating ok  He has continuous nasal drainage and occasional headaches since starting treatment, otherwise tolerating ok and thinks it might be helping already  Continue to follow up with neurology

## 2025-07-06 DIAGNOSIS — R05.9 COUGH, UNSPECIFIED TYPE: ICD-10-CM

## 2025-07-06 DIAGNOSIS — I10 HYPERTENSION, UNSPECIFIED TYPE: ICD-10-CM

## 2025-07-07 ENCOUNTER — HOSPITAL ENCOUNTER (OUTPATIENT)
Dept: RADIOLOGY | Facility: HOSPITAL | Age: 80
Discharge: HOME | End: 2025-07-07
Payer: MEDICARE

## 2025-07-07 DIAGNOSIS — F02.A0 MILD ALZHEIMER'S DEMENTIA, UNSPECIFIED TIMING OF DEMENTIA ONSET, UNSPECIFIED WHETHER BEHAVIORAL, PSYCHOTIC, OR MOOD DISTURBANCE OR ANXIETY: ICD-10-CM

## 2025-07-07 DIAGNOSIS — G30.9 MILD ALZHEIMER'S DEMENTIA, UNSPECIFIED TIMING OF DEMENTIA ONSET, UNSPECIFIED WHETHER BEHAVIORAL, PSYCHOTIC, OR MOOD DISTURBANCE OR ANXIETY: ICD-10-CM

## 2025-07-07 DIAGNOSIS — G31.84 MILD COGNITIVE IMPAIRMENT WITH MEMORY LOSS: ICD-10-CM

## 2025-07-07 PROCEDURE — 70551 MRI BRAIN STEM W/O DYE: CPT

## 2025-07-07 RX ORDER — AMLODIPINE BESYLATE 2.5 MG/1
TABLET ORAL
Qty: 60 TABLET | Refills: 2 | Status: SHIPPED | OUTPATIENT
Start: 2025-07-07

## 2025-07-09 NOTE — RESULT ENCOUNTER NOTE
I have reviewed MRI brain performed on 7/7/25 as part of safety protocol for anti-amyloid therapy. I have reviewed the images and report - they do not show any evidence of new hemorrhage or hemosiderin deposition or cerebral edema. There is no MRI contraindication to starting/continuing anti-amyloid therapy.

## 2025-07-14 ENCOUNTER — OFFICE VISIT (OUTPATIENT)
Dept: BEHAVIORAL HEALTH | Facility: CLINIC | Age: 80
End: 2025-07-14
Payer: MEDICARE

## 2025-07-14 ENCOUNTER — APPOINTMENT (OUTPATIENT)
Dept: NEUROLOGY | Facility: CLINIC | Age: 80
End: 2025-07-14
Payer: MEDICARE

## 2025-07-14 VITALS
RESPIRATION RATE: 18 BRPM | DIASTOLIC BLOOD PRESSURE: 71 MMHG | HEART RATE: 63 BPM | WEIGHT: 143.9 LBS | TEMPERATURE: 97.9 F | BODY MASS INDEX: 20.07 KG/M2 | SYSTOLIC BLOOD PRESSURE: 145 MMHG

## 2025-07-14 DIAGNOSIS — G30.1 MILD LATE ONSET ALZHEIMER'S DEMENTIA WITHOUT BEHAVIORAL DISTURBANCE, PSYCHOTIC DISTURBANCE, MOOD DISTURBANCE, OR ANXIETY (MULTI): ICD-10-CM

## 2025-07-14 DIAGNOSIS — F02.A0 MILD LATE ONSET ALZHEIMER'S DEMENTIA WITHOUT BEHAVIORAL DISTURBANCE, PSYCHOTIC DISTURBANCE, MOOD DISTURBANCE, OR ANXIETY (MULTI): ICD-10-CM

## 2025-07-14 PROCEDURE — 3077F SYST BP >= 140 MM HG: CPT | Performed by: PSYCHIATRY & NEUROLOGY

## 2025-07-14 PROCEDURE — 99214 OFFICE O/P EST MOD 30 MIN: CPT | Mod: AM | Performed by: PSYCHIATRY & NEUROLOGY

## 2025-07-14 PROCEDURE — 1126F AMNT PAIN NOTED NONE PRSNT: CPT | Performed by: PSYCHIATRY & NEUROLOGY

## 2025-07-14 PROCEDURE — 99214 OFFICE O/P EST MOD 30 MIN: CPT | Performed by: PSYCHIATRY & NEUROLOGY

## 2025-07-14 PROCEDURE — 1036F TOBACCO NON-USER: CPT | Performed by: PSYCHIATRY & NEUROLOGY

## 2025-07-14 PROCEDURE — 3078F DIAST BP <80 MM HG: CPT | Performed by: PSYCHIATRY & NEUROLOGY

## 2025-07-14 ASSESSMENT — ENCOUNTER SYMPTOMS
OCCASIONAL FEELINGS OF UNSTEADINESS: 0
LOSS OF SENSATION IN FEET: 1

## 2025-07-14 ASSESSMENT — PAIN SCALES - GENERAL: PAINLEVEL_OUTOF10: 0-NO PAIN

## 2025-07-14 NOTE — PROGRESS NOTES
[place], [place]      [facility] Initial Consultation:     Luis Bain  is 80 y.o. -year-old with a history of mild cognitive impairment due to Alzheimer's disease (on anti-amyloid therapy), HTN, hyperlipidemia, heart murmur. Seen in office for follow up.   Seen with wife Alyse.    Subjective:   He has received Kisunla #3 infusions. Denies any side effects to anti-amyloid therapy - no new/worsening headaches, confusion, unsteady or worsening gait, dizziness, nausea, vision changes, seizures or new neurological changes. He says he gets tired for a day or two after the infusion.     Most recent safety MRI on 7/7/25 - one possible ARIA-H (microhemorrhage) in left frontal lobe that appeared after infusion #2.     They note that his memory seems about the same. Denied any significant changes in cognition or functioning.   He reads on his phone/tablet and uses the computer without any significant difficulty. They try exercises to help his memory and word-finding.    Functional changes:   Current living situation - Lives at home; lives with spouse  Driving - Denies problems driving.   Finances - does it together with wife.   Cooking - denies problems. Has not had kitchen mishaps   ADLS - independent with ADLs.   Medications - Takes own medication. Has a pillbox.   Weapons at home: no    Neuropsychiatric symptoms:   Depression - denies depression. Gets frustrated when he cannot find words. Denies any death wishes or SI.    Anxiety - denied  Psychosis - none   Gay - none  Suicidality - denied.     Psychiatric Review Of Systems:     As above     Medical Review Of Systems:    Pertinent items are noted in HPI.      Current medications reviewed:   Kisunla infusions q 4 weeks.   Donepezil 10mg daily   Gabapentin 600mg at bedtime    PMH/PSH:  Past Medical History:   Diagnosis Date    BMI 20.0-20.9, adult 09/08/2023    Central stenosis of spinal canal 09/08/2023    Chronic pain of  right knee 09/08/2023    Essential hypertension 05/01/2023    Fatigue 05/01/2023    Headache 05/01/2023    HL (hearing loss) 10 years ago    Hyperlipidemia 05/01/2023    Last Assessment & Plan: Formatting of this note might be different from the original. 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 1 each 0    cholecalciferol, vitamin D3, (VITAMIN D3 ORAL) Take by mouth.      donanemab-azbt (Kisunla) 17.5 mg/mL solution injection Infuse 40 mL (700 mg) into a venous catheter every 28 (twenty-eight) days for 3 doses. 40 mL 1    donanemab-azbt (Kisunla) 17.5 mg/mL solution injection Infuse 80 mL (1,400 mg) into a venous catheter every 28 (twenty-eight) days. After 3 induction doses 80 mL 6    donepezil (Aricept) 10 mg tablet Take 1 tablet (10 mg) by mouth once daily at bedtime.      finasteride (Proscar) 5 mg tablet TAKE 1 TABLET BY MOUTH DAILY (DO NOT CRUSH, CHEW, OR SPLIT) 90 tablet 0    fluticasone (Flonase) 50 mcg/actuation nasal spray Administer 1 spray into each nostril once daily. Shake gently. Before first use, prime pump. After use, clean tip and replace cap. 16 g 0    gabapentin (Neurontin) 300 mg capsule Take 1 capsule (300 mg) by mouth once daily at bedtime. (Patient taking differently: Take 2 capsules (600 mg) by mouth once daily at bedtime.) 90 capsule 0     "lansoprazole (Prevacid) 30 mg DR capsule Take 1 capsule (30 mg) by mouth once daily.      levocetirizine (Xyzal) 5 mg tablet Take 1 tablet (5 mg) by mouth once daily in the evening. 90 tablet 1    multivitamin with minerals iron-free (Centrum Silver) Take 1 tablet by mouth once daily.      sildenafil (Viagra) 100 mg tablet Take 1 tablet (100 mg) by mouth if needed.      simvastatin (Zocor) 10 mg tablet TAKE 1 TABLET BY MOUTH ONCE  DAILY AT BEDTIME 90 tablet 0     No current facility-administered medications on file prior to visit.        Allergies:   Allergies   Allergen Reactions    Succinylcholine Unknown and Other     Pt states age 41s had surgery took over 3 hours to wake, heard people talking, could not move or respond. Had no further testing or eval, had no problem waking since that incident.    Succinylcholine Chloride Other and Unknown     Delayed Awakening    Pt states age 41s had surgery took over 3 hours to wake, heard people talking, could not move or respond. Had no further testing or eval, had no problem waking since that incident.     Mental Status Examination:  Appearance: Appears to be stated age. Well groomed. Good hygiene.   Behavior/Attitude: Cooperative. Pleasant.    Motor: Psychomotor activity in average range. No abnormal involuntary movements.    Speech: Regular in rate, tone and volume. No pressure.   Mood: \"okay\"  Affect: Congruent to stated mood. Mobilized appropriately. Normal range.    Thought process: Goal-directed. Linear. Organized.   Thought content: No paranoia, delusion or ideas of reference elicited. No hallucinations in auditory, visual or other sensory modalities.    Suicidal ideation: denied.   Homicidal ideation: none reported.   Insight: Fair  Judgment: Fair  Recent and remote memory: fair recall of recent and remote information but wife assisted with details.   Attention/concentration: intact during visit   Language: Had word-finding difficulties during conversation. No " paraphasic errors.   Fund of knowledge: Average     NEUROLOGICAL EXAMINATION  Cranial nerves:  Cranial nerve II: Visual fields full to confrontation.   Cranial nerves III, IV, and VI: Pupils round, equally reactive to light; no ptosis. EOMs intact. No nystagmus.   Cranial Nerve V: Facial sensation intact bilaterally.   Cranial nerve VII: Normal and symmetric facial strength.   Cranial nerve VIII: Hearing is intact bilaterally to tuning fork.   Cranial nerves IX and X: Palate elevates symmetrically.   Cranial nerve XI: Shoulder shrug and neck rotation strength are intact.   Cranial nerve XII: Tongue midline with normal strength.    Motor:   Normal muscle tone, bulk, and strength. No asymmetries noted.  Finger taps - normal  Hand opening/closing - normal  Tone - normal  Abnormal movements - No    Reflexes:   Right UE           LEFT UE  BR: normal               BR: normal  Biceps: normal  Biceps: normal  Triceps: normal   Triceps: normal    RIGHT LE    LEFT LE  Knee: normal   Knee: normal  Ankle: normal  Ankle: normal    Jos's reflex - negative    Coordination:  Finger-nose-finger testing is normal and without tremor or dysmetria.    Gait: no ataxia or bradykinesia. No shuffling. Normal arm swing.     Able to stand from seated position with arms across chest: Yes    Sensory:  Normal to light touch and vibration in all extremities    Romberg's sign: Normal      Assessment/Plan   Assessment:   Luis Bain  is 80 y.o. -year-old with a history of mild cognitive impairment due to Alzheimer's disease (on anti-amyloid therapy), HTN, hyperlipidemia, heart murmur. Seen in office for follow up.     Initial impression:   3/10/25 - He has a history of insidious onset and gradual progression of word-finding difficulties. His memory is not significantly impaired. There is minimal functional impairment related to managing finances but otherwise is independent in all ADLs and iADLs.     TSH - 2.63 (1/9/25)   Vit B12 - 709  (4/30/24)  Folate - 17.9 (5/5/23)  Neuropsychological testing -   Brain [facility] - MRI brain 1/20/2025 - per report:   *  No evidence of an acute intracranial process or intracranial mass.   *  Mild to moderate generalized volume loss.   *  Hippocampal volumes at the 51st percentile when compared to age   matched normal controls by quantitative analysis.   *  Mild white matter disease which is nonspecific but likely reflective   of chronic microvascular ischemia.   *  No evidence of parenchymal microhemorrhages by MRI.     Amyloid status: - Amyloid PET (11/22/24) - positive (101.3 Centiloids)  ApoE genotype: e2/e3    MMSE:  3/10/25 - 15/30    7/14/25 - he has received Kisunla infusions #3; one ARIA-H (microhemorrhage) noted after infusion #2; asymptomatic.     Diagnosis:   Mild cognitive impairment vs early dementia.   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   Etiological diagnosis is Alzheimer's disease.     Treatment Plan/Recommendations:   - Continue Kisunla infusions per treatment schedule.   - Continue donepezil 10mg daily.   - Educated on potential side effects.   - MRI brain after 6th infusion, then testing and follow up visit in office. Already scheduled.   - Contact sooner if needed.     Natan Ramirez MD.

## 2025-07-14 NOTE — PATIENT INSTRUCTIONS
Plan:   - Continue Kisunla infusions every 4 weeks.   - Monitor for any side effects to Kisunla including any new/worsening headaches, confusion, unsteady or worsening gait, dizziness, nausea, vision changes, seizures or new neurological changes. Call [phone] or use [employer] to contact anti-amyloid therapy nurse coordinatior Pham Jc RN in case of any questions or concerns regarding treatment. Go to the ER or call 911 in case of serious symptoms.   - Continue donepezil 10mg daily   - MRI brain after 6th infusion, then testing and follow up visit in office.   - Contact sooner if needed.     Brain-healthy lifestyle:   - Make sure your medical conditions (if any) such as diabetes, high blood pressure, high cholesterol, thyroid disease sleep apnea are optimally controlled.   - Use eyeglasses or hearing aids appropriately if needed.   - Eat a heart healthy diet (like a Mediterranean diet; lots of fruits and vegetables, fish; low fat;)  - Exercise regularly as tolerated.   - Maintain good sleep hygiene; avoid daytime naps; try to get 7 to 8 hours of continuous sleep at night.   - Stay mentally active - puzzles, word searches, books, playing cards.  - Stay socially active and engaged.

## 2025-07-19 LAB
ALBUMIN SERPL-MCNC: 4.1 G/DL (ref 3.6–5.1)
ALP SERPL-CCNC: 52 U/L (ref 35–144)
ALT SERPL-CCNC: 11 U/L (ref 9–46)
ANION GAP SERPL CALCULATED.4IONS-SCNC: 6 MMOL/L (CALC) (ref 7–17)
AST SERPL-CCNC: 18 U/L (ref 10–35)
BASOPHILS # BLD AUTO: 10 CELLS/UL (ref 0–200)
BASOPHILS NFR BLD AUTO: 0.2 %
BILIRUB SERPL-MCNC: 0.9 MG/DL (ref 0.2–1.2)
BUN SERPL-MCNC: 17 MG/DL (ref 7–25)
CALCIUM SERPL-MCNC: 9.1 MG/DL (ref 8.6–10.3)
CHLORIDE SERPL-SCNC: 106 MMOL/L (ref 98–110)
CHOLEST SERPL-MCNC: 165 MG/DL
CHOLEST/HDLC SERPL: 2.5 (CALC)
CO2 SERPL-SCNC: 29 MMOL/L (ref 20–32)
CREAT SERPL-MCNC: 0.9 MG/DL (ref 0.7–1.28)
EGFRCR SERPLBLD CKD-EPI 2021: 87 ML/MIN/1.73M2
EOSINOPHIL # BLD AUTO: 181 CELLS/UL (ref 15–500)
EOSINOPHIL NFR BLD AUTO: 3.7 %
ERYTHROCYTE [DISTWIDTH] IN BLOOD BY AUTOMATED COUNT: 12.4 % (ref 11–15)
GLUCOSE SERPL-MCNC: 96 MG/DL (ref 65–99)
HCT VFR BLD AUTO: 41.7 % (ref 38.5–50)
HDLC SERPL-MCNC: 66 MG/DL
HGB BLD-MCNC: 13.7 G/DL (ref 13.2–17.1)
LDLC SERPL CALC-MCNC: 82 MG/DL (CALC)
LYMPHOCYTES # BLD AUTO: 1406 CELLS/UL (ref 850–3900)
LYMPHOCYTES NFR BLD AUTO: 28.7 %
MCH RBC QN AUTO: 32.5 PG (ref 27–33)
MCHC RBC AUTO-ENTMCNC: 32.9 G/DL (ref 32–36)
MCV RBC AUTO: 98.8 FL (ref 80–100)
MONOCYTES # BLD AUTO: 495 CELLS/UL (ref 200–950)
MONOCYTES NFR BLD AUTO: 10.1 %
NEUTROPHILS # BLD AUTO: 2808 CELLS/UL (ref 1500–7800)
NEUTROPHILS NFR BLD AUTO: 57.3 %
NONHDLC SERPL-MCNC: 99 MG/DL (CALC)
PLATELET # BLD AUTO: 185 THOUSAND/UL (ref 140–400)
PMV BLD REES-ECKER: 10 FL (ref 7.5–12.5)
POTASSIUM SERPL-SCNC: 4 MMOL/L (ref 3.5–5.3)
PROT SERPL-MCNC: 6 G/DL (ref 6.1–8.1)
PSA SERPL-MCNC: 2.91 NG/ML
RBC # BLD AUTO: 4.22 MILLION/UL (ref 4.2–5.8)
SODIUM SERPL-SCNC: 141 MMOL/L (ref 135–146)
TRIGL SERPL-MCNC: 85 MG/DL
TSH SERPL-ACNC: 2.5 MIU/L (ref 0.4–4.5)
WBC # BLD AUTO: 4.9 THOUSAND/UL (ref 3.8–10.8)

## 2025-07-29 ENCOUNTER — APPOINTMENT (OUTPATIENT)
Dept: INFUSION THERAPY | Facility: CLINIC | Age: 80
End: 2025-07-29
Payer: MEDICARE

## 2025-07-29 VITALS
RESPIRATION RATE: 18 BRPM | HEART RATE: 59 BPM | OXYGEN SATURATION: 99 % | SYSTOLIC BLOOD PRESSURE: 119 MMHG | DIASTOLIC BLOOD PRESSURE: 70 MMHG | TEMPERATURE: 98.1 F | BODY MASS INDEX: 19.72 KG/M2 | WEIGHT: 141.4 LBS

## 2025-07-29 DIAGNOSIS — G31.84 MILD COGNITIVE IMPAIRMENT WITH MEMORY LOSS: ICD-10-CM

## 2025-07-29 DIAGNOSIS — R09.81 NASAL CONGESTION: ICD-10-CM

## 2025-07-29 PROCEDURE — 96413 CHEMO IV INFUSION 1 HR: CPT | Performed by: NURSE PRACTITIONER

## 2025-07-29 RX ORDER — FLUTICASONE PROPIONATE 50 MCG
1 SPRAY, SUSPENSION (ML) NASAL DAILY
Qty: 16 G | Refills: 0 | Status: SHIPPED | OUTPATIENT
Start: 2025-07-29 | End: 2026-07-29

## 2025-07-29 RX ORDER — EPINEPHRINE 0.3 MG/.3ML
0.3 INJECTION SUBCUTANEOUS EVERY 5 MIN PRN
OUTPATIENT
Start: 2025-08-26

## 2025-07-29 RX ORDER — FAMOTIDINE 10 MG/ML
20 INJECTION, SOLUTION INTRAVENOUS ONCE AS NEEDED
OUTPATIENT
Start: 2025-08-26

## 2025-07-29 RX ORDER — ALBUTEROL SULFATE 0.83 MG/ML
3 SOLUTION RESPIRATORY (INHALATION) AS NEEDED
OUTPATIENT
Start: 2025-08-26

## 2025-07-29 RX ORDER — DIPHENHYDRAMINE HYDROCHLORIDE 50 MG/ML
50 INJECTION, SOLUTION INTRAMUSCULAR; INTRAVENOUS AS NEEDED
OUTPATIENT
Start: 2025-08-26

## 2025-07-29 RX ORDER — ACETAMINOPHEN 325 MG/1
650 TABLET ORAL ONCE
Status: COMPLETED | OUTPATIENT
Start: 2025-07-29 | End: 2025-07-29

## 2025-07-29 RX ADMIN — ACETAMINOPHEN 650 MG: 325 TABLET ORAL at 10:32

## 2025-07-29 ASSESSMENT — ENCOUNTER SYMPTOMS
DYSURIA: 0
ARTHRALGIAS: 0
CONSTIPATION: 0
VOMITING: 0
HEMATURIA: 0
EXTREMITY WEAKNESS: 0
FREQUENCY: 0
NUMBNESS: 0
MYALGIAS: 0
EYE PROBLEMS: 0
COUGH: 0
FEVER: 0
WHEEZING: 0
LEG SWELLING: 0
FATIGUE: 0
HEADACHES: 0
DIARRHEA: 0
LIGHT-HEADEDNESS: 0
PALPITATIONS: 0
SHORTNESS OF BREATH: 0
UNEXPECTED WEIGHT CHANGE: 0
BLOOD IN STOOL: 0
NAUSEA: 0
ABDOMINAL PAIN: 0
VOICE CHANGE: 0
WOUND: 0
TROUBLE SWALLOWING: 0
CHILLS: 0
SORE THROAT: 0
APPETITE CHANGE: 0
DIZZINESS: 0
BRUISES/BLEEDS EASILY: 0

## 2025-07-29 ASSESSMENT — PAIN SCALES - GENERAL: PAINLEVEL_OUTOF10: 0-NO PAIN

## 2025-07-29 NOTE — PATIENT INSTRUCTIONS
Today :We administered cetirizine, acetaminophen, and donanemab-azbt.     For:   1. Mild cognitive impairment with memory loss         Your next appointment is due in:  8/26/2025       Please read the  Medication Guide that was given to you and reviewed during todays visit.     (Tell all doctors including dentists that you are taking this medication)     Go to the emergency room or call 911 if:  -You have signs of allergic reaction:   -Rash, hives, itching.   -Swollen, blistered, peeling skin.   -Swelling of face, lips, mouth, tongue or throat.   -Tightness of chest, trouble breathing, swallowing or talking     Call your doctor:  - If IV / injection site gets red, warm, swollen, itchy or leaks fluid or pus.     (Leave dressing on your IV site for at least 2 hours and keep area clean and dry  - If you get sick or have symptoms of infection or are not feeling well for any reason.    (Wash your hands often, stay away from people who are sick)  - If you have side effects from your medication that do not go away or are bothersome.     (Refer to the teaching your nurse gave you for side effects to call your doctor about)    - Common side effects may include:  stuffy nose, headache, feeling tired, muscle aches, upset stomach  - Before receiving any vaccines     - Call [facility] at   If:  - You get sick, are on antibiotics, have had a recent vaccine, have surgery or dental work and your doctor wants your visit rescheduled.  - You need to cancel and reschedule your visit for any reason. Call at least 2 days before your visit if you need to cancel.   - Your insurance changes before your next visit.    (We will need to get approval from your new insurance. This can take up to two weeks.)     The [facility] is opened Monday thru Friday. We are closed on weekends and holidays.   Voice mail will take your call if the center is closed. If you leave a message please allow 24 hours for a call  back during weekdays. If you leave a message on a weekend/holiday, we will call you back the next business day.    A pharmacist is available Monday - Friday from 8:30AM to 3:30PM to help answer any questions you may have about your prescriptions(s). Please call pharmacy at:    [facility]: [phone]  [facility]: [phone]  [facility]: [phone]

## 2025-07-29 NOTE — PROGRESS NOTES
[facility]   Infusion Clinic Note   Date: 2025   Name: Luis Bain  : 1945   MRN: [id number]         Reason for Visit: OP Infusion ([place])         Today: We administered cetirizine, acetaminophen, and donanemab-azbt.       Referring Provider: Natan Ramirez MD    Plan Provider: Natan Ramirez MD      For a Diagnosis of: Mild cognitive impairment with memory loss       At today's visit patient accompanied by: Wife      Today's Vitals:   Vitals:    25 0955 25 1114 25 1215   BP: 123/71 110/66 119/70   Pulse: 70 59 59   Resp: 18 18 18   Temp: 36.2 °C (97.2 °F) 36.2 °C (97.2 °F) 36.7 °C (98.1 °F)   SpO2: 100% 99% 99%   Weight: 64.1 kg (141 lb 6.4 oz)     PainSc: 0-No pain               Pre - Treatment Checklist:      - Previous reaction to current treatment: no      (Assess patient for the concerns below. Document provider notification as appropriate).  - Active or recent infection with/without current antibiotic use: no  - Recent or planned invasive dental work: no  - Recent or planned surgeries: no  - Recently received or plans to receive vaccinations: no  - Has treatment related toxicities: no  - Any chance may be pregnant:  no, n/a      Pain: 0   - Is the pain different from normal: n/a   - Is prescribing Doctor aware:  n/a      Labs: Reviewed       Fall Risk Screening:              Review Of Systems:  Review of Systems   Constitutional:  Negative for appetite change, chills, fatigue, fever and unexpected weight change.   HENT:   Negative for hearing loss, mouth sores, sore throat, tinnitus, trouble swallowing and voice change.    Eyes:  Negative for eye problems.   Respiratory:  Negative for cough, shortness of breath and wheezing.    Cardiovascular:  Negative for chest pain, leg swelling and palpitations.   Gastrointestinal:  Negative for abdominal pain, blood in stool, constipation, diarrhea, nausea and vomiting.   Genitourinary:  Negative for  dysuria, frequency and hematuria.    Musculoskeletal:  Negative for arthralgias and myalgias.   Skin:  Negative for itching, rash and wound.   Neurological:  Negative for dizziness, extremity weakness, headaches, light-headedness and numbness.   Hematological:  Does not bruise/bleed easily.         Infusion Readiness:  - Assessment Concerns Related to Infusion: No  - Provider notified: n/a      New Patient Education:    N/A (returning patient for continuation of therapy. Ongoing education provided as needed.)        Treatment Conditions & Drug Specific Questions:    Donanemab (KISUNLA) SUBSEQUENT INFUSIONS   (Unless otherwise specified on patient specific therapy plan):    TREATMENT CONDITIONS. CONFIRM BEFORE TODAY'S INFUSION:  - Today is infusion number: 4th    - Patient must have a new brain MRI completed before the 2nd, 3rd, 4th and 7th  infusions AND prescribers note on MRI, recommending continuation of infusion therapy.     - Patient does not have symptoms suggestive of ARIA (amyloid-related imaging abnormalities.    - Patient does not have symptoms of treatment related toxicities.    DRUG SPECIFIC QUESTIONS:  - MRI completed prior to the 2nd, 3rd, 4th and 7th  infusions. MRI reviewed by neurology AND note in chart recommending continuation of infusion therapy?  Yes    (IF NO CONTACT PRESCRIBING PROVIDER PRIOR TO PROCEEDING WITH INFUSION)    - Does the patient have any new or worsening symptoms suggestive of amyloid-related imaging abnormalities (ARIA) which may include new/worsening headache, visual changes, confusion, unsteady gait / gait   difficult, dizziness, nausea, seizure or any neurological changes? No     (IF YES CONTACT PRESCRIBING PROVIDER PRIOR TO PROCEEDING WITH INFUSION)    - Educate patients to report signs and symptoms of CNS changes which may include new/worsening headache, visual changes, confusion, unsteady gait / gait difficult, dizziness, nausea, seizure or any neurological changes?  No    Pre-Medications (Prior to first 5 infusions only; Unless known history reaction)    Did the patient take Tyelnol and Zyrtec at home prior to today's infusion (first 5 infusions only)? No  (If NO, notify NP to place order)    Recommended Vitals/Observation:  Vitals: Obtain at start and end of infusion; at end of observation period and as needed.   Observation: 60 minute observation after the first 4 infusion then 30 minute observation after all subsequent infusions Observation:[id number]}         Weight Based Drug Calculations:    WEIGHT BASED DRUGS: NOT APPLICABLE / FLAT DOSE       Post Treatment: Patient tolerated treatment without issue and was discharged in no apparent distress.      Note Authored / Patient Cared for By: Stephanie Patricia RN

## 2025-07-30 ENCOUNTER — APPOINTMENT (OUTPATIENT)
Dept: BEHAVIORAL HEALTH | Facility: CLINIC | Age: 80
End: 2025-07-30
Payer: MEDICARE

## 2025-08-06 DIAGNOSIS — N40.0 BENIGN PROSTATIC HYPERPLASIA WITHOUT LOWER URINARY TRACT SYMPTOMS: ICD-10-CM

## 2025-08-06 DIAGNOSIS — E78.5 DYSLIPIDEMIA: ICD-10-CM

## 2025-08-07 RX ORDER — SIMVASTATIN 10 MG/1
10 TABLET, FILM COATED ORAL NIGHTLY
Qty: 90 TABLET | Refills: 3 | Status: SHIPPED | OUTPATIENT
Start: 2025-08-07

## 2025-08-07 RX ORDER — FINASTERIDE 5 MG/1
TABLET, FILM COATED ORAL
Qty: 90 TABLET | Refills: 3 | Status: SHIPPED | OUTPATIENT
Start: 2025-08-07

## 2025-08-26 ENCOUNTER — APPOINTMENT (OUTPATIENT)
Dept: INFUSION THERAPY | Facility: CLINIC | Age: 80
End: 2025-08-26
Payer: MEDICARE

## 2025-08-26 VITALS
OXYGEN SATURATION: 100 % | BODY MASS INDEX: 20.15 KG/M2 | WEIGHT: 144.5 LBS | RESPIRATION RATE: 18 BRPM | TEMPERATURE: 97.9 F | HEART RATE: 67 BPM | DIASTOLIC BLOOD PRESSURE: 67 MMHG | SYSTOLIC BLOOD PRESSURE: 132 MMHG

## 2025-08-26 DIAGNOSIS — G31.84 MILD COGNITIVE IMPAIRMENT WITH MEMORY LOSS: ICD-10-CM

## 2025-08-26 PROCEDURE — 96413 CHEMO IV INFUSION 1 HR: CPT | Performed by: NURSE PRACTITIONER

## 2025-08-26 RX ORDER — DIPHENHYDRAMINE HYDROCHLORIDE 50 MG/ML
50 INJECTION, SOLUTION INTRAMUSCULAR; INTRAVENOUS AS NEEDED
OUTPATIENT
Start: 2025-09-23

## 2025-08-26 RX ORDER — ALBUTEROL SULFATE 0.83 MG/ML
3 SOLUTION RESPIRATORY (INHALATION) AS NEEDED
OUTPATIENT
Start: 2025-09-23

## 2025-08-26 RX ORDER — FAMOTIDINE 10 MG/ML
20 INJECTION, SOLUTION INTRAVENOUS ONCE AS NEEDED
OUTPATIENT
Start: 2025-09-23

## 2025-08-26 RX ORDER — EPINEPHRINE 0.3 MG/.3ML
0.3 INJECTION SUBCUTANEOUS EVERY 5 MIN PRN
OUTPATIENT
Start: 2025-09-23

## 2025-08-26 ASSESSMENT — ENCOUNTER SYMPTOMS
LIGHT-HEADEDNESS: 0
SHORTNESS OF BREATH: 0
FEVER: 0
COUGH: 0
NAUSEA: 0
EYE PROBLEMS: 0
DIARRHEA: 0
DIZZINESS: 0
HEMATURIA: 0
PALPITATIONS: 0
FATIGUE: 0
CHILLS: 0
WHEEZING: 0
ABDOMINAL PAIN: 0
BRUISES/BLEEDS EASILY: 0
ARTHRALGIAS: 0
BLOOD IN STOOL: 0
SORE THROAT: 0
APPETITE CHANGE: 0
EXTREMITY WEAKNESS: 0
TROUBLE SWALLOWING: 0
WOUND: 0
UNEXPECTED WEIGHT CHANGE: 0
LEG SWELLING: 0
DYSURIA: 0
NUMBNESS: 0
MYALGIAS: 0
HEADACHES: 1
FREQUENCY: 0
VOICE CHANGE: 0
CONSTIPATION: 0
VOMITING: 0

## 2025-08-26 ASSESSMENT — PAIN SCALES - GENERAL: PAINLEVEL_OUTOF10: 0-NO PAIN

## 2025-08-27 ENCOUNTER — TELEPHONE (OUTPATIENT)
Dept: NEUROLOGY | Facility: CLINIC | Age: 80
End: 2025-08-27
Payer: MEDICARE

## 2025-08-27 DIAGNOSIS — F02.A0 MILD LATE ONSET ALZHEIMER'S DEMENTIA WITHOUT BEHAVIORAL DISTURBANCE, PSYCHOTIC DISTURBANCE, MOOD DISTURBANCE, OR ANXIETY (MULTI): ICD-10-CM

## 2025-08-27 DIAGNOSIS — G30.1 MILD LATE ONSET ALZHEIMER'S DEMENTIA WITHOUT BEHAVIORAL DISTURBANCE, PSYCHOTIC DISTURBANCE, MOOD DISTURBANCE, OR ANXIETY (MULTI): ICD-10-CM

## 2025-09-03 ENCOUNTER — HOSPITAL ENCOUNTER (OUTPATIENT)
Dept: RADIOLOGY | Facility: HOSPITAL | Age: 80
Discharge: HOME | End: 2025-09-03
Payer: MEDICARE

## 2025-09-03 DIAGNOSIS — G30.1 MILD LATE ONSET ALZHEIMER'S DEMENTIA WITHOUT BEHAVIORAL DISTURBANCE, PSYCHOTIC DISTURBANCE, MOOD DISTURBANCE, OR ANXIETY (MULTI): ICD-10-CM

## 2025-09-03 DIAGNOSIS — F02.A0 MILD LATE ONSET ALZHEIMER'S DEMENTIA WITHOUT BEHAVIORAL DISTURBANCE, PSYCHOTIC DISTURBANCE, MOOD DISTURBANCE, OR ANXIETY (MULTI): ICD-10-CM

## 2025-09-03 PROCEDURE — 70551 MRI BRAIN STEM W/O DYE: CPT

## 2025-09-04 ENCOUNTER — OFFICE VISIT (OUTPATIENT)
Dept: BEHAVIORAL HEALTH | Facility: CLINIC | Age: 80
End: 2025-09-04
Payer: MEDICARE

## 2025-09-04 VITALS
TEMPERATURE: 97.3 F | DIASTOLIC BLOOD PRESSURE: 81 MMHG | SYSTOLIC BLOOD PRESSURE: 134 MMHG | WEIGHT: 140.9 LBS | HEART RATE: 69 BPM | BODY MASS INDEX: 19.65 KG/M2

## 2025-09-04 DIAGNOSIS — F02.80 ALZHEIMER DISEASE (MULTI): ICD-10-CM

## 2025-09-04 DIAGNOSIS — G93.6 CEREBRAL EDEMA: ICD-10-CM

## 2025-09-04 DIAGNOSIS — F02.A0 MILD LATE ONSET ALZHEIMER'S DEMENTIA WITHOUT BEHAVIORAL DISTURBANCE, PSYCHOTIC DISTURBANCE, MOOD DISTURBANCE, OR ANXIETY (MULTI): ICD-10-CM

## 2025-09-04 DIAGNOSIS — G31.84 MILD COGNITIVE IMPAIRMENT: ICD-10-CM

## 2025-09-04 DIAGNOSIS — G30.9 ALZHEIMER DISEASE (MULTI): ICD-10-CM

## 2025-09-04 DIAGNOSIS — G30.1 MILD LATE ONSET ALZHEIMER'S DEMENTIA WITHOUT BEHAVIORAL DISTURBANCE, PSYCHOTIC DISTURBANCE, MOOD DISTURBANCE, OR ANXIETY (MULTI): ICD-10-CM

## 2025-09-04 PROCEDURE — 1159F MED LIST DOCD IN RCRD: CPT | Performed by: PSYCHIATRY & NEUROLOGY

## 2025-09-04 PROCEDURE — 99215 OFFICE O/P EST HI 40 MIN: CPT | Performed by: PSYCHIATRY & NEUROLOGY

## 2025-09-04 PROCEDURE — 3079F DIAST BP 80-89 MM HG: CPT | Performed by: PSYCHIATRY & NEUROLOGY

## 2025-09-04 PROCEDURE — 99215 OFFICE O/P EST HI 40 MIN: CPT | Mod: AM | Performed by: PSYCHIATRY & NEUROLOGY

## 2025-09-04 PROCEDURE — 3075F SYST BP GE 130 - 139MM HG: CPT | Performed by: PSYCHIATRY & NEUROLOGY

## 2025-09-04 PROCEDURE — 1125F AMNT PAIN NOTED PAIN PRSNT: CPT | Performed by: PSYCHIATRY & NEUROLOGY

## 2025-09-04 PROCEDURE — 1036F TOBACCO NON-USER: CPT | Performed by: PSYCHIATRY & NEUROLOGY

## 2025-09-04 ASSESSMENT — PATIENT HEALTH QUESTIONNAIRE - PHQ9: 2. FEELING DOWN, DEPRESSED OR HOPELESS: NOT AT ALL

## 2025-09-04 ASSESSMENT — ENCOUNTER SYMPTOMS
OCCASIONAL FEELINGS OF UNSTEADINESS: 0
LOSS OF SENSATION IN FEET: 0

## 2025-09-04 ASSESSMENT — PAIN SCALES - GENERAL: PAINLEVEL_OUTOF10: 3

## 2025-09-05 ENCOUNTER — TELEPHONE (OUTPATIENT)
Dept: BEHAVIORAL HEALTH | Facility: CLINIC | Age: 80
End: 2025-09-05
Payer: MEDICARE

## 2025-09-23 ENCOUNTER — APPOINTMENT (OUTPATIENT)
Dept: INFUSION THERAPY | Facility: CLINIC | Age: 80
End: 2025-09-23
Payer: MEDICARE

## 2025-10-21 ENCOUNTER — APPOINTMENT (OUTPATIENT)
Dept: INFUSION THERAPY | Facility: CLINIC | Age: 80
End: 2025-10-21
Payer: MEDICARE

## 2025-11-18 ENCOUNTER — APPOINTMENT (OUTPATIENT)
Dept: INFUSION THERAPY | Facility: CLINIC | Age: 80
End: 2025-11-18
Payer: MEDICARE

## 2025-12-16 ENCOUNTER — APPOINTMENT (OUTPATIENT)
Dept: INFUSION THERAPY | Facility: CLINIC | Age: 80
End: 2025-12-16
Payer: MEDICARE

## 2026-01-05 ENCOUNTER — APPOINTMENT (OUTPATIENT)
Dept: PRIMARY CARE | Facility: CLINIC | Age: 81
End: 2026-01-05
Payer: MEDICARE